# Patient Record
Sex: MALE | Race: WHITE | NOT HISPANIC OR LATINO | Employment: FULL TIME | URBAN - METROPOLITAN AREA
[De-identification: names, ages, dates, MRNs, and addresses within clinical notes are randomized per-mention and may not be internally consistent; named-entity substitution may affect disease eponyms.]

---

## 2017-03-28 ENCOUNTER — TRANSFERRED RECORDS (OUTPATIENT)
Dept: HEALTH INFORMATION MANAGEMENT | Facility: CLINIC | Age: 57
End: 2017-03-28

## 2017-04-07 ENCOUNTER — TRANSFERRED RECORDS (OUTPATIENT)
Dept: HEALTH INFORMATION MANAGEMENT | Facility: CLINIC | Age: 57
End: 2017-04-07

## 2018-03-30 ENCOUNTER — TRANSFERRED RECORDS (OUTPATIENT)
Dept: HEALTH INFORMATION MANAGEMENT | Facility: CLINIC | Age: 58
End: 2018-03-30

## 2018-04-06 ENCOUNTER — TRANSFERRED RECORDS (OUTPATIENT)
Dept: HEALTH INFORMATION MANAGEMENT | Facility: CLINIC | Age: 58
End: 2018-04-06

## 2019-04-02 ENCOUNTER — TRANSFERRED RECORDS (OUTPATIENT)
Dept: HEALTH INFORMATION MANAGEMENT | Facility: CLINIC | Age: 59
End: 2019-04-02

## 2019-05-10 ENCOUNTER — TRANSFERRED RECORDS (OUTPATIENT)
Dept: HEALTH INFORMATION MANAGEMENT | Facility: CLINIC | Age: 59
End: 2019-05-10

## 2019-07-12 ENCOUNTER — TRANSFERRED RECORDS (OUTPATIENT)
Dept: HEALTH INFORMATION MANAGEMENT | Facility: CLINIC | Age: 59
End: 2019-07-12

## 2019-09-20 ENCOUNTER — TRANSFERRED RECORDS (OUTPATIENT)
Dept: HEALTH INFORMATION MANAGEMENT | Facility: CLINIC | Age: 59
End: 2019-09-20

## 2019-10-22 ENCOUNTER — TRANSFERRED RECORDS (OUTPATIENT)
Dept: HEALTH INFORMATION MANAGEMENT | Facility: CLINIC | Age: 59
End: 2019-10-22

## 2019-12-10 ENCOUNTER — TRANSFERRED RECORDS (OUTPATIENT)
Dept: HEALTH INFORMATION MANAGEMENT | Facility: CLINIC | Age: 59
End: 2019-12-10

## 2020-03-10 ENCOUNTER — TRANSFERRED RECORDS (OUTPATIENT)
Dept: HEALTH INFORMATION MANAGEMENT | Facility: CLINIC | Age: 60
End: 2020-03-10

## 2021-03-04 NOTE — PROGRESS NOTES
[facility]  [place]    SUBJECTIVE    CC: Maximino Linares is an 60 year old male who presents for preventive health visit.     Patient has been advised of split billing requirements and indicates understanding: Yes     Healthy Habits:    Do you get at least three servings of calcium containing foods daily (dairy, green leafy vegetables, etc.)? yes    Amount of exercise or daily activities, outside of work: 6-7 day(s) per week    Problems taking medications regularly No    Medication side effects: No    Have you had an eye exam in the past two years? yes    Do you see a dentist twice per year? yes    Do you have sleep apnea, excessive snoring or daytime drowsiness?uses cpap    Today's PHQ-2 Score:   PHQ-2 ( 1999 Pfizer) 3/5/2021   Q1: Little interest or pleasure in doing things 0   Q2: Feeling down, depressed or hopeless 0   PHQ-2 Score 0       Abuse: Current or Past(Physical, Sexual or Emotional)- No  Do you feel safe in your environment? Yes    Have you ever done Advance Care Planning? (For example, a Health Directive, POLST, or a discussion with a medical provider or your loved ones about your wishes): No, advance care planning information given to patient to review.  Advanced care planning was discussed at today's visit.    Social History     Tobacco Use     Smoking status: Never Smoker     Smokeless tobacco: Former User     Types: Chew   Substance Use Topics     Alcohol use: Yes     Alcohol/week: 1.0 - 2.0 standard drinks     Types: 1 - 2 Standard drinks or equivalent per week     Comment: 1-2 per week     If you drink alcohol do you typically have >3 drinks per day or >7 drinks per week? No                      Last PSA: No results found for: PSA    Reviewed orders with patient. Reviewed health maintenance and updated orders accordingly - Yes    See above - extensive history reviewed    Health Maintenance     Colonoscopy:  Due every 5 yrs   FIT:  given              PSA:  pending   DEXA:  [facility]  Maintenance Due   Topic Date Due     ZOSTER IMMUNIZATION (2 of 2) 05/28/2019     COLORECTAL CANCER SCREENING  06/01/2019       Current Problem List    Patient Active Problem List   Diagnosis     Hyperlipidemia LDL goal <130     History of skin cancer     Cardiac arrhythmia, unspecified cardiac arrhythmia type       Past Medical History    Past Medical History:   Diagnosis Date     Cardiac arrhythmia, unspecified cardiac arrhythmia type     benign, extensive work up     History of skin cancer     SCC, BCC     Hyperlipidemia LDL goal <130      Mild intermittent asthma without complication     resolved       Past Surgical History    Past Surgical History:   Procedure Laterality Date     SURGICAL HISTORY OF -  Right     x 2, 2000, 2005, arthroscopy, ACL reconstruction     SURGICAL HISTORY OF -   2017    Deviated septum/soft palpate repair       Current Medications    Current Outpatient Medications   Medication Sig Dispense Refill     atorvastatin (LIPITOR) 40 MG tablet Take 1 tablet (40 mg) by mouth daily 90 tablet 3     sildenafil (VIAGRA) 50 MG tablet Take 1 tablet (50 mg) by mouth daily as needed (30-60 minutes before intercourse) 6 tablet 3       Allergies    Allergies   Allergen Reactions     Morphine Nausea and Vomiting       Immunizations    Immunization History   Administered Date(s) Administered     Pneumococcal 23 valent 12/10/2019     TDAP Vaccine (Boostrix) 02/08/2021     Zoster vaccine recombinant adjuvanted (SHINGRIX) 04/02/2019       Family History    Family History   Problem Relation Age of Onset     Colon Cancer Mother 68     Abdominal Aortic Aneurysm Father      Bladder Cancer Father 75     Arrhythmia Sister      Heart Failure Maternal Grandmother      Lung Cancer Maternal Grandfather         smoker     Heart Failure Paternal Grandmother      Abdominal Aortic Aneurysm Paternal Uncle      Alcoholism Paternal Grandfather      Obesity Paternal Grandfather        Social History    Social History      Socioeconomic History     Marital status:      Spouse name: Rand     Number of children: 3     Years of education: Not on file     Highest education level: Not on file   Occupational History     Not on file   Social Needs     Financial resource strain: Not on file     Food insecurity     Worry: Not on file     Inability: Not on file     Transportation needs     Medical: Not on file     Non-medical: Not on file   Tobacco Use     Smoking status: Never Smoker     Smokeless tobacco: Former User     Types: Chew   Substance and Sexual Activity     Alcohol use: Yes     Alcohol/week: 1.0 - 2.0 standard drinks     Types: 1 - 2 Standard drinks or equivalent per week     Comment: 1-2 per week     Drug use: Not Currently     Sexual activity: Yes   Lifestyle     Physical activity     Days per week: Not on file     Minutes per session: Not on file     Stress: Not on file   Relationships     Social connections     Talks on phone: Not on file     Gets together: Not on file     Attends Druze service: Not on file     Active member of club or organization: Not on file     Attends meetings of clubs or organizations: Not on file     Relationship status: Not on file     Intimate partner violence     Fear of current or ex partner: Not on file     Emotionally abused: Not on file     Physically abused: Not on file     Forced sexual activity: Not on file   Other Topics Concern     Not on file   Social History Narrative     Not on file       ROS    CONSTITUTIONAL: NEGATIVE for fever, chills, change in weight  INTEGUMENTARY/SKIN: NEGATIVE for worrisome rashes, moles or lesions  EYES: NEGATIVE for vision changes or irritation  ENT/MOUTH: NEGATIVE for ear, mouth and throat problems  RESP: NEGATIVE for significant cough or SOB  BREAST: NEGATIVE for masses, tenderness or discharge  CV: NEGATIVE for chest pain, palpitations or peripheral edema  GI: NEGATIVE for nausea, abdominal pain, heartburn, or change in bowel habits  :  "NEGATIVE for frequency, dysuria, or hematuria  MUSCULOSKELETAL: NEGATIVE for significant arthralgias or myalgia  NEURO: NEGATIVE for weakness, dizziness or paresthesias  ENDOCRINE: NEGATIVE for temperature intolerance, skin/hair changes  HEME: NEGATIVE for bleeding problems  PSYCHIATRIC: NEGATIVE for changes in mood or affect    OBJECTIVE    /72   Pulse 84   Temp 96.8  F (36  C) (Tympanic)   Ht 1.803 m (5' 11\")   Wt 89.4 kg (197 lb)   SpO2 96%   BMI 27.48 kg/m      EXAM:    GENERAL: healthy, alert and no distress  EYES: Eyes grossly normal to inspection, PERRL and conjunctivae and sclerae normal  HENT: ear canals and TM's normal, nose and mouth without ulcers or lesions  NECK: no adenopathy, no asymmetry, masses, or scars and thyroid normal to palpation  RESP: lungs clear to auscultation - no rales, rhonchi or wheezes  CV: regular rate and rhythm, normal S1 S2, no S3 or S4, no murmur, click or rub, no peripheral edema and peripheral pulses strong  ABDOMEN: soft, nontender, no hepatosplenomegaly, no masses and bowel sounds normal   (male): testicles normal without atrophy or masses, no hernias and penis normal without urethral discharge  RECTAL: normal sphincter tone, no rectal masses and prostate of normal size for age, smooth, nontender without masses/nodules  MS: no gross musculoskeletal defects noted, no edema  SKIN: no suspicious lesions or rashes  NEURO: Normal strength and tone, mentation intact and speech normal  PSYCH: mentation appears normal, affect normal/bright  LYMPH: no cervical, supraclavicular, axillary, or inguinal adenopathy    DIAGNOSTICS/PROCEDURES    Pending    ASSESSMENT      ICD-10-CM    1. Routine general medical examination at a health care facility  Z00.00 REVIEW OF HEALTH MAINTENANCE PROTOCOL ORDERS     HIV Antigen Antibody Combo     Hepatitis C Screen Reflex to HCV RNA Quant and Genotype     Comprehensive metabolic panel     Lipid panel reflex to direct LDL Fasting     CK " total     CBC with platelets     TSH with free T4 reflex     UA reflex to Microscopic and Culture     Albumin Random Urine Quantitative with Creat Ratio     Prostate spec antigen screen     Fecal colorectal cancer screen FIT     atorvastatin (LIPITOR) 40 MG tablet     sildenafil (VIAGRA) 50 MG tablet     Urine Microscopic   2. Hyperlipidemia LDL goal <130  E78.5 REVIEW OF HEALTH MAINTENANCE PROTOCOL ORDERS     Comprehensive metabolic panel     Lipid panel reflex to direct LDL Fasting     CK total   3. History of skin cancer  Z85.828 REVIEW OF HEALTH MAINTENANCE PROTOCOL ORDERS     DERMATOLOGY ADULT REFERRAL   4. Cardiac arrhythmia, unspecified cardiac arrhythmia type  I49.9 REVIEW OF HEALTH MAINTENANCE PROTOCOL ORDERS     Comprehensive metabolic panel     TSH with free T4 reflex   5. Family history of colon cancer  Z80.0    6. Screen for colon cancer  Z12.11 REVIEW OF HEALTH MAINTENANCE PROTOCOL ORDERS     Fecal colorectal cancer screen FIT   7. Screening for prostate cancer  Z12.5 REVIEW OF HEALTH MAINTENANCE PROTOCOL ORDERS     Prostate spec antigen screen   8. Screening for HIV (human immunodeficiency virus)  Z11.4 REVIEW OF HEALTH MAINTENANCE PROTOCOL ORDERS     HIV Antigen Antibody Combo   9. Need for hepatitis C screening test  Z11.59 REVIEW OF HEALTH MAINTENANCE PROTOCOL ORDERS     Hepatitis C Screen Reflex to HCV RNA Quant and Genotype   10. Medication monitoring encounter  Z51.81 REVIEW OF HEALTH MAINTENANCE PROTOCOL ORDERS     Comprehensive metabolic panel     Lipid panel reflex to direct LDL Fasting     CK total     CBC with platelets     TSH with free T4 reflex     UA reflex to Microscopic and Culture     Albumin Random Urine Quantitative with Creat Ratio       PLAN    Discussed treatment/modality options, including risk and benefits, he desires:    advised alcohol consumption 1oz per day or less, advised aspirin 81 mg po daily, advised 1 multivitamin per day, advised calcium 0187-1017 mg/d and Vitamin D  "800-1200 IU/d, advised dentist every 6 months, advised diet and exercise, advised opthalmologist every 1-2 years, advised self testicular exam q month, further health care maintenance, further lab(s), medication refill(s) and observation    All diagnosis above reviewed and noted above, otherwise stable.      See [facility] orders for further details.      1) med refills    2) labs    3) dermatology - Dr Hein    4) check on immunizations    5) check on colonscopy    Return in about 1 year (around 3/5/2022), or if symptoms worsen or fail to improve, for Complete Physical, Medication Recheck Visit, Follow Up Chronic.    Health Maintenance Due   Topic Date Due     ZOSTER IMMUNIZATION (2 of 2) 05/28/2019     COLORECTAL CANCER SCREENING  06/01/2019       COUNSELING    Reviewed preventive health counseling, as reflected in patient instructions    BP Readings from Last 1 Encounters:   03/05/21 126/72     Estimated body mass index is 27.48 kg/m  as calculated from the following:    Height as of this encounter: 1.803 m (5' 11\").    Weight as of this encounter: 89.4 kg (197 lb).           reports that he has never smoked. He has quit using smokeless tobacco.  His smokeless tobacco use included chew.      Counseling Resources:    ATP IV Guidelines  Pooled Cohorts Equation Calculator  FRAX Risk Assessment  ICSI Preventive Guidelines  Dietary Guidelines for Americans, 2010  USDA's MyPlate  ASA Prophylaxis  Lung CA Screening           Lucas Capellan MD, FAAFP     [facility]  [address] [place] [address]  [email or web address]   Office: [phone]  Fax: [phone]  Pager: [phone]       "

## 2021-03-05 ENCOUNTER — OFFICE VISIT (OUTPATIENT)
Dept: FAMILY MEDICINE | Facility: CLINIC | Age: 61
End: 2021-03-05
Payer: COMMERCIAL

## 2021-03-05 VITALS
HEART RATE: 84 BPM | WEIGHT: 197 LBS | BODY MASS INDEX: 27.58 KG/M2 | SYSTOLIC BLOOD PRESSURE: 126 MMHG | HEIGHT: 71 IN | TEMPERATURE: 96.8 F | OXYGEN SATURATION: 96 % | DIASTOLIC BLOOD PRESSURE: 72 MMHG

## 2021-03-05 DIAGNOSIS — I49.9 CARDIAC ARRHYTHMIA, UNSPECIFIED CARDIAC ARRHYTHMIA TYPE: ICD-10-CM

## 2021-03-05 DIAGNOSIS — Z12.5 SCREENING FOR PROSTATE CANCER: ICD-10-CM

## 2021-03-05 DIAGNOSIS — Z11.4 SCREENING FOR HIV (HUMAN IMMUNODEFICIENCY VIRUS): ICD-10-CM

## 2021-03-05 DIAGNOSIS — Z51.81 MEDICATION MONITORING ENCOUNTER: ICD-10-CM

## 2021-03-05 DIAGNOSIS — Z11.59 NEED FOR HEPATITIS C SCREENING TEST: ICD-10-CM

## 2021-03-05 DIAGNOSIS — E78.5 HYPERLIPIDEMIA LDL GOAL <130: ICD-10-CM

## 2021-03-05 DIAGNOSIS — Z85.828 HISTORY OF SKIN CANCER: ICD-10-CM

## 2021-03-05 DIAGNOSIS — Z00.00 ROUTINE GENERAL MEDICAL EXAMINATION AT A HEALTH CARE FACILITY: Primary | ICD-10-CM

## 2021-03-05 DIAGNOSIS — Z80.0 FAMILY HISTORY OF COLON CANCER: ICD-10-CM

## 2021-03-05 DIAGNOSIS — Z12.11 SCREEN FOR COLON CANCER: ICD-10-CM

## 2021-03-05 LAB
ALBUMIN SERPL-MCNC: 3.8 G/DL (ref 3.4–5)
ALBUMIN UR-MCNC: NEGATIVE MG/DL
ALP SERPL-CCNC: 81 U/L (ref 40–150)
ALT SERPL W P-5'-P-CCNC: 45 U/L (ref 0–70)
ANION GAP SERPL CALCULATED.3IONS-SCNC: 3 MMOL/L (ref 3–14)
APPEARANCE UR: CLEAR
AST SERPL W P-5'-P-CCNC: 17 U/L (ref 0–45)
BILIRUB SERPL-MCNC: 0.4 MG/DL (ref 0.2–1.3)
BILIRUB UR QL STRIP: NEGATIVE
BUN SERPL-MCNC: 18 MG/DL (ref 7–30)
CALCIUM SERPL-MCNC: 9.6 MG/DL (ref 8.5–10.1)
CHLORIDE SERPL-SCNC: 110 MMOL/L (ref 94–109)
CHOLEST SERPL-MCNC: 179 MG/DL
CK SERPL-CCNC: 109 U/L (ref 30–300)
CO2 SERPL-SCNC: 27 MMOL/L (ref 20–32)
COLOR UR AUTO: YELLOW
CREAT SERPL-MCNC: 0.99 MG/DL (ref 0.66–1.25)
CREAT UR-MCNC: 144 MG/DL
ERYTHROCYTE [DISTWIDTH] IN BLOOD BY AUTOMATED COUNT: 12.4 % (ref 10–15)
GFR SERPL CREATININE-BSD FRML MDRD: 83 ML/MIN/{1.73_M2}
GLUCOSE SERPL-MCNC: 91 MG/DL (ref 70–99)
GLUCOSE UR STRIP-MCNC: NEGATIVE MG/DL
HCT VFR BLD AUTO: 47.3 % (ref 40–53)
HCV AB SERPL QL IA: NONREACTIVE
HDLC SERPL-MCNC: 44 MG/DL
HGB BLD-MCNC: 15.9 G/DL (ref 13.3–17.7)
HGB UR QL STRIP: ABNORMAL
HIV 1+2 AB+HIV1 P24 AG SERPL QL IA: NONREACTIVE
KETONES UR STRIP-MCNC: NEGATIVE MG/DL
LDLC SERPL CALC-MCNC: 93 MG/DL
LEUKOCYTE ESTERASE UR QL STRIP: NEGATIVE
MCH RBC QN AUTO: 29.1 PG (ref 26.5–33)
MCHC RBC AUTO-ENTMCNC: 33.6 G/DL (ref 31.5–36.5)
MCV RBC AUTO: 87 FL (ref 78–100)
MICROALBUMIN UR-MCNC: 7 MG/L
MICROALBUMIN/CREAT UR: 5.01 MG/G CR (ref 0–17)
NITRATE UR QL: NEGATIVE
NONHDLC SERPL-MCNC: 135 MG/DL
PH UR STRIP: 6.5 PH (ref 5–7)
PLATELET # BLD AUTO: 244 10E9/L (ref 150–450)
POTASSIUM SERPL-SCNC: 4.3 MMOL/L (ref 3.4–5.3)
PROT SERPL-MCNC: 7.6 G/DL (ref 6.8–8.8)
PSA SERPL-ACNC: 0.84 UG/L (ref 0–4)
RBC # BLD AUTO: 5.47 10E12/L (ref 4.4–5.9)
RBC #/AREA URNS AUTO: NORMAL /HPF
SODIUM SERPL-SCNC: 140 MMOL/L (ref 133–144)
SOURCE: ABNORMAL
SP GR UR STRIP: >1.03 (ref 1–1.03)
TRIGL SERPL-MCNC: 211 MG/DL
TSH SERPL DL<=0.005 MIU/L-ACNC: 2.57 MU/L (ref 0.4–4)
UROBILINOGEN UR STRIP-ACNC: 0.2 EU/DL (ref 0.2–1)
WBC # BLD AUTO: 7.9 10E9/L (ref 4–11)
WBC #/AREA URNS AUTO: NORMAL /HPF

## 2021-03-05 PROCEDURE — 80061 LIPID PANEL: CPT | Performed by: FAMILY MEDICINE

## 2021-03-05 PROCEDURE — 81001 URINALYSIS AUTO W/SCOPE: CPT | Performed by: FAMILY MEDICINE

## 2021-03-05 PROCEDURE — 84443 ASSAY THYROID STIM HORMONE: CPT | Performed by: FAMILY MEDICINE

## 2021-03-05 PROCEDURE — 87389 HIV-1 AG W/HIV-1&-2 AB AG IA: CPT | Performed by: FAMILY MEDICINE

## 2021-03-05 PROCEDURE — 80053 COMPREHEN METABOLIC PANEL: CPT | Performed by: FAMILY MEDICINE

## 2021-03-05 PROCEDURE — 99386 PREV VISIT NEW AGE 40-64: CPT | Performed by: FAMILY MEDICINE

## 2021-03-05 PROCEDURE — 82043 UR ALBUMIN QUANTITATIVE: CPT | Performed by: FAMILY MEDICINE

## 2021-03-05 PROCEDURE — G0103 PSA SCREENING: HCPCS | Performed by: FAMILY MEDICINE

## 2021-03-05 PROCEDURE — 86803 HEPATITIS C AB TEST: CPT | Performed by: FAMILY MEDICINE

## 2021-03-05 PROCEDURE — 36415 COLL VENOUS BLD VENIPUNCTURE: CPT | Performed by: FAMILY MEDICINE

## 2021-03-05 PROCEDURE — 82550 ASSAY OF CK (CPK): CPT | Performed by: FAMILY MEDICINE

## 2021-03-05 PROCEDURE — 85027 COMPLETE CBC AUTOMATED: CPT | Performed by: FAMILY MEDICINE

## 2021-03-05 RX ORDER — ATORVASTATIN CALCIUM 40 MG/1
40 TABLET, FILM COATED ORAL DAILY
COMMUNITY
End: 2021-03-05

## 2021-03-05 RX ORDER — ATORVASTATIN CALCIUM 40 MG/1
40 TABLET, FILM COATED ORAL DAILY
Qty: 90 TABLET | Refills: 3 | Status: SHIPPED | OUTPATIENT
Start: 2021-03-05 | End: 2022-03-29

## 2021-03-05 RX ORDER — SILDENAFIL 50 MG/1
50 TABLET, FILM COATED ORAL DAILY PRN
Qty: 6 TABLET | Refills: 3 | Status: SHIPPED | OUTPATIENT
Start: 2021-03-05 | End: 2022-04-11

## 2021-03-05 RX ORDER — SILDENAFIL 50 MG/1
50 TABLET, FILM COATED ORAL DAILY PRN
COMMUNITY
End: 2021-03-05

## 2021-03-05 SDOH — ECONOMIC STABILITY: FOOD INSECURITY: WITHIN THE PAST 12 MONTHS, YOU WORRIED THAT YOUR FOOD WOULD RUN OUT BEFORE YOU GOT MONEY TO BUY MORE.: NOT ASKED

## 2021-03-05 SDOH — ECONOMIC STABILITY: TRANSPORTATION INSECURITY
IN THE PAST 12 MONTHS, HAS THE LACK OF TRANSPORTATION KEPT YOU FROM MEDICAL APPOINTMENTS OR FROM GETTING MEDICATIONS?: NOT ASKED

## 2021-03-05 SDOH — ECONOMIC STABILITY: FOOD INSECURITY: WITHIN THE PAST 12 MONTHS, THE FOOD YOU BOUGHT JUST DIDN'T LAST AND YOU DIDN'T HAVE MONEY TO GET MORE.: NOT ASKED

## 2021-03-05 SDOH — ECONOMIC STABILITY: INCOME INSECURITY: HOW HARD IS IT FOR YOU TO PAY FOR THE VERY BASICS LIKE FOOD, HOUSING, MEDICAL CARE, AND HEATING?: NOT ASKED

## 2021-03-05 SDOH — HEALTH STABILITY: MENTAL HEALTH: HOW OFTEN DO YOU HAVE 6 OR MORE DRINKS ON ONE OCCASION?: NOT ASKED

## 2021-03-05 SDOH — HEALTH STABILITY: MENTAL HEALTH: HOW MANY STANDARD DRINKS CONTAINING ALCOHOL DO YOU HAVE ON A TYPICAL DAY?: NOT ASKED

## 2021-03-05 SDOH — HEALTH STABILITY: MENTAL HEALTH: HOW OFTEN DO YOU HAVE A DRINK CONTAINING ALCOHOL?: NOT ASKED

## 2021-03-05 SDOH — ECONOMIC STABILITY: TRANSPORTATION INSECURITY
IN THE PAST 12 MONTHS, HAS LACK OF TRANSPORTATION KEPT YOU FROM MEETINGS, WORK, OR FROM GETTING THINGS NEEDED FOR DAILY LIVING?: NOT ASKED

## 2021-03-05 ASSESSMENT — MIFFLIN-ST. JEOR: SCORE: 1730.72

## 2021-03-05 NOTE — LETTER
[facility]  [address] [place] [address]  [phone]                    March 8, 2021    Maximino Linares  [address] [address] [address]      Dear Maximino,    Here is a summary of your recent test results:    Labs are overall quite good, except     Mildly elevated triglycerides     We advise:     Continue current cares.   Balanced low cholesterol diet.   Regular exercise.     Recheck fasting labs in 3-4 months (lipid reflex)     Your test results are enclosed.             Thank you very much for trusting [facility].     Healthy regards,          Lucas Capellan M.D.        Results for orders placed or performed in visit on 03/05/21   HIV Antigen Antibody Combo     Status: None   Result Value Ref Range    HIV Antigen Antibody Combo Nonreactive NR^Nonreactive       Hepatitis C Screen Reflex to HCV RNA Quant and Genotype     Status: None   Result Value Ref Range    Hepatitis C Antibody Nonreactive NR^Nonreactive   Comprehensive metabolic panel     Status: Abnormal   Result Value Ref Range    Sodium 140 133 - 144 mmol/L    Potassium 4.3 3.4 - 5.3 mmol/L    Chloride 110 (H) 94 - 109 mmol/L    Carbon Dioxide 27 20 - 32 mmol/L    Anion Gap 3 3 - 14 mmol/L    Glucose 91 70 - 99 mg/dL    Urea Nitrogen 18 7 - 30 mg/dL    Creatinine 0.99 0.66 - 1.25 mg/dL    GFR Estimate 83 >60 mL/min/[1.73_m2]    GFR Estimate If Black >90 >60 mL/min/[1.73_m2]    Calcium 9.6 8.5 - 10.1 mg/dL    Bilirubin Total 0.4 0.2 - 1.3 mg/dL    Albumin 3.8 3.4 - 5.0 g/dL    Protein Total 7.6 6.8 - 8.8 g/dL    Alkaline Phosphatase 81 40 - 150 U/L    ALT 45 0 - 70 U/L    AST 17 0 - 45 U/L   Lipid panel reflex to direct LDL Fasting     Status: Abnormal   Result Value Ref Range    Cholesterol 179 <200 mg/dL    Triglycerides 211 (H) <150 mg/dL    HDL Cholesterol 44 >39 mg/dL    LDL Cholesterol Calculated 93 <100 mg/dL    Non HDL Cholesterol 135 (H) <130 mg/dL   CK total     Status: None    Result Value Ref Range    CK Total 109 30 - 300 U/L   CBC with platelets     Status: None   Result Value Ref Range    WBC 7.9 4.0 - 11.0 10e9/L    RBC Count 5.47 4.4 - 5.9 10e12/L    Hemoglobin 15.9 13.3 - 17.7 g/dL    Hematocrit 47.3 40.0 - 53.0 %    MCV 87 78 - 100 fl    MCH 29.1 26.5 - 33.0 pg    MCHC 33.6 31.5 - 36.5 g/dL    RDW 12.4 10.0 - 15.0 %    Platelet Count 244 150 - 450 10e9/L   TSH with free T4 reflex     Status: None   Result Value Ref Range    TSH 2.57 0.40 - 4.00 mU/L   UA reflex to Microscopic and Culture     Status: Abnormal    Specimen: Midstream Urine   Result Value Ref Range    Color Urine Yellow     Appearance Urine Clear     Glucose Urine Negative NEG^Negative mg/dL    Bilirubin Urine Negative NEG^Negative    Ketones Urine Negative NEG^Negative mg/dL    Specific Gravity Urine >1.030 1.003 - 1.035    Blood Urine Trace (A) NEG^Negative    pH Urine 6.5 5.0 - 7.0 pH    Protein Albumin Urine Negative NEG^Negative mg/dL    Urobilinogen Urine 0.2 0.2 - 1.0 EU/dL    Nitrite Urine Negative NEG^Negative    Leukocyte Esterase Urine Negative NEG^Negative    Source Midstream Urine    Albumin Random Urine Quantitative with Creat Ratio     Status: None   Result Value Ref Range    Creatinine Urine 144 mg/dL    Albumin Urine mg/L 7 mg/L    Albumin Urine mg/g Cr 5.01 0 - 17 mg/g Cr   Prostate spec antigen screen     Status: None   Result Value Ref Range    PSA 0.84 0 - 4 ug/L   Urine Microscopic     Status: None   Result Value Ref Range    WBC Urine 0 - 5 OTO5^0 - 5 /HPF    RBC Urine O - 2 OTO2^O - 2 /HPF

## 2021-03-06 ASSESSMENT — ASTHMA QUESTIONNAIRES: ACT_TOTALSCORE: 25

## 2021-05-14 ENCOUNTER — OFFICE VISIT (OUTPATIENT)
Dept: DERMATOLOGY | Facility: CLINIC | Age: 61
End: 2021-05-14
Attending: FAMILY MEDICINE
Payer: COMMERCIAL

## 2021-05-14 VITALS — SYSTOLIC BLOOD PRESSURE: 122 MMHG | DIASTOLIC BLOOD PRESSURE: 80 MMHG | HEART RATE: 73 BPM | OXYGEN SATURATION: 97 %

## 2021-05-14 DIAGNOSIS — Z85.828 HISTORY OF SKIN CANCER: ICD-10-CM

## 2021-05-14 DIAGNOSIS — D22.9 NEVUS: Primary | ICD-10-CM

## 2021-05-14 DIAGNOSIS — D18.01 ANGIOMA OF SKIN: ICD-10-CM

## 2021-05-14 DIAGNOSIS — L81.4 LENTIGO: ICD-10-CM

## 2021-05-14 DIAGNOSIS — L82.1 SEBORRHEIC KERATOSIS: ICD-10-CM

## 2021-05-14 PROCEDURE — 99203 OFFICE O/P NEW LOW 30 MIN: CPT | Performed by: PHYSICIAN ASSISTANT

## 2021-05-14 NOTE — LETTER
5/14/2021         RE: Maximino Linares  [address] [address] [address]        Dear Colleague,    Thank you for referring your patient, Maximino Linares, to the [facility]. Please see a copy of my visit note below.    HPI:   Chief complaints: Maximino Linares is a pleasant 61 year old male who presents for Full skin cancer screening to rule out skin cancer   Last Skin Exam: about 1 year ago in [place]      1st Baseline: no  Personal HX of Skin Cancer: yes BCC and SCC   Personal HX of Malignant Melanoma: no   Family HX of Skin Cancer / Malignant Melanoma: no  Personal HX of Atypical Moles:   no  Risk factors: history of sun exposure and burns  New / Changing lesions:no  Social History: from [place] originally. Was in [place] for the past 5 years. Enjoys fishing  On review of systems, there are no further skin complaints, patient is feeling otherwise well.   ROS of the following were done and are negative: Constitutional, Eyes, Ears, Nose,   Mouth, Throat, Cardiovascular, Respiratory, GI, Genitourinary, Musculoskeletal,   Psychiatric, Endocrine, Allergic/Immunologic.    PHYSICAL EXAM:   /80   Pulse 73   SpO2 97%   Skin exam performed as follows: Type 2 skin. Mood appropriate  Alert and Oriented X 3. Well developed, well nourished in no distress.  General appearance: Normal  Head including face: Normal  Eyes: conjunctiva and lids: Normal  Mouth: Lips, teeth, gums: Normal  Neck: Normal  Chest-breast/axillae: Normal  Back: Normal  Spleen and liver: Normal  Cardiovascular: Exam of peripheral vascular system by observation for swelling, varicosities, edema: Normal  Genitalia: groin, buttocks: Normal  Extremities: digits/nails (clubbing): Normal  Eccrine and Apocrine glands: Normal  Right upper extremity: Normal  Left upper extremity: Normal  Right lower extremity: Normal  Left lower extremity: Normal  Skin: Scalp and body hair: See below    Pt deferred exam of breasts, groin, buttocks:  No    Other physical findings:  1. Multiple pigmented macules on extremities and trunk  2. Multiple pigmented macules on face, trunk and extremities  3. Multiple vascular papules on trunk, arms and legs  4. Multiple scattered keratotic plaques  5. Firm papule with dimple sign on the left medial buttocks       Except as noted above, no other signs of skin cancer or melanoma.     ASSESSMENT/PLAN:   Benign Full skin cancer screening today. . Patient with history of BCC and SCC  Advised on monthly self exams and 1 year  Patient Education: Appropriate brochures given.    1. Multiple benign appearing nevi on arms, legs and trunk. Discussed ABCDEs of melanoma and sunscreen.   2. Multiple lentigos on arms, legs and trunk. Advised benign, no treatment needed.  3. Multiple scattered angiomas. Advised benign, no treatment needed.   4. Seborrheic keratosis on arms, legs and trunk. Advised benign, no treatment needed.  5. Dermatofibroma on the left medial buttocks. Advised benign no treatment needed.               Follow-up: yearly FSE/PRN sooner    1.) Patient was asked about new and changing moles. YES  2.) Patient received a complete physical skin examination: YES  3.) Patient was counseled to perform a monthly self skin examination: YES  Scribed By: Niru Dailey, MS, PAERICA          Again, thank you for allowing me to participate in the care of your patient.        Sincerely,        Niru Dailey PA-C

## 2021-05-14 NOTE — PROGRESS NOTES
HPI:   Chief complaints: Maximino Linares is a pleasant 61 year old male who presents for Full skin cancer screening to rule out skin cancer   Last Skin Exam: about 1 year ago in [place]      1st Baseline: no  Personal HX of Skin Cancer: yes BCC and SCC   Personal HX of Malignant Melanoma: no   Family HX of Skin Cancer / Malignant Melanoma: no  Personal HX of Atypical Moles:   no  Risk factors: history of sun exposure and burns  New / Changing lesions:no  Social History: from [place] originally. Was in [place] for the past 5 years. Enjoys fishing  On review of systems, there are no further skin complaints, patient is feeling otherwise well.   ROS of the following were done and are negative: Constitutional, Eyes, Ears, Nose,   Mouth, Throat, Cardiovascular, Respiratory, GI, Genitourinary, Musculoskeletal,   Psychiatric, Endocrine, Allergic/Immunologic.    PHYSICAL EXAM:   /80   Pulse 73   SpO2 97%   Skin exam performed as follows: Type 2 skin. Mood appropriate  Alert and Oriented X 3. Well developed, well nourished in no distress.  General appearance: Normal  Head including face: Normal  Eyes: conjunctiva and lids: Normal  Mouth: Lips, teeth, gums: Normal  Neck: Normal  Chest-breast/axillae: Normal  Back: Normal  Spleen and liver: Normal  Cardiovascular: Exam of peripheral vascular system by observation for swelling, varicosities, edema: Normal  Genitalia: groin, buttocks: Normal  Extremities: digits/nails (clubbing): Normal  Eccrine and Apocrine glands: Normal  Right upper extremity: Normal  Left upper extremity: Normal  Right lower extremity: Normal  Left lower extremity: Normal  Skin: Scalp and body hair: See below    Pt deferred exam of breasts, groin, buttocks: No    Other physical findings:  1. Multiple pigmented macules on extremities and trunk  2. Multiple pigmented macules on face, trunk and extremities  3. Multiple vascular papules on trunk, arms and legs  4. Multiple scattered keratotic plaques  5. Firm papule  with dimple sign on the left medial buttocks       Except as noted above, no other signs of skin cancer or melanoma.     ASSESSMENT/PLAN:   Benign Full skin cancer screening today. . Patient with history of BCC and SCC  Advised on monthly self exams and 1 year  Patient Education: Appropriate brochures given.    1. Multiple benign appearing nevi on arms, legs and trunk. Discussed ABCDEs of melanoma and sunscreen.   2. Multiple lentigos on arms, legs and trunk. Advised benign, no treatment needed.  3. Multiple scattered angiomas. Advised benign, no treatment needed.   4. Seborrheic keratosis on arms, legs and trunk. Advised benign, no treatment needed.  5. Dermatofibroma on the left medial buttocks. Advised benign no treatment needed.               Follow-up: yearly FSE/PRN sooner    1.) Patient was asked about new and changing moles. YES  2.) Patient received a complete physical skin examination: YES  3.) Patient was counseled to perform a monthly self skin examination: YES  Scribed By: Niru Dailey MS, PAERICA

## 2021-10-06 ENCOUNTER — TELEPHONE (OUTPATIENT)
Dept: FAMILY MEDICINE | Facility: CLINIC | Age: 61
End: 2021-10-06

## 2021-10-06 DIAGNOSIS — R06.81 APNEA: Primary | ICD-10-CM

## 2021-10-06 NOTE — TELEPHONE ENCOUNTER
Patient calling in regards to CPAP machine recall. He is in need of a new order to be placed. Please advise.     Stanford Benito

## 2021-10-07 NOTE — TELEPHONE ENCOUNTER
Please review with patient    Advise sleep clinic consult - NATALIE Garcia - to make sure we get the right CPAP for him         Your sleep apnea treatment may be affected by device recall     Our records show that you may have a Jermaine Respironics CPAP for the treatment of sleep apnea. Many of these devices have been recalled* by the  for replacement. [facility] recommends:     1) If you are using a Resmed device, continue using the device.  2) If you have a Jermaine Respironics device, register your device for confirmation of type of device and repair of the device at [email or web address]/sleep/communications/src-update -if you cannot use link, call [phone].  The website will assist you in obtaining the serial number for registration.   3) If you are using a Jermaine Respironics CPAP or Bilevel PAP device and you do not have immediate breathing, driving or cardiovascular risks without the device, consider stopping use of the device after verification that is has been recalled. Discuss this decision with your medical provider if you are uncertain about your medical risks.  4) If you are not using Respironics CPAP but are using a Respironics advanced device for breathing support (AVAPS, ASV, Bilevel PAP), continue using the device and review 5 and 6 below).     5) If you continue the device, do not include ozone generating  connected to PAP devices.  6) f you continue the device, you may choose to use a bacterial filter to reduce particulates from the device although on CPAP devices, pressures may need to be increased with the filter in place. These filters are not as effective as repair of the device.

## 2021-10-07 NOTE — TELEPHONE ENCOUNTER
Called # [phone]     Pt called advised of note below. Pt stated he has contacted Jermaine below and will be months before getting replacement. Pt noted he did contact insurance and was advised can get replacement device if having new order. Writer advised will place new order and fax to [facility] medical in [place]. Pt did not know settings noting should be in chart, recent sleep study 2 years ago.      Alberto Juarez RN   [facility] Triage

## 2021-10-13 ENCOUNTER — TELEPHONE (OUTPATIENT)
Dept: FAMILY MEDICINE | Facility: CLINIC | Age: 61
End: 2021-10-13

## 2022-01-25 ENCOUNTER — ANCILLARY PROCEDURE (OUTPATIENT)
Dept: GENERAL RADIOLOGY | Facility: CLINIC | Age: 62
End: 2022-01-25
Attending: NURSE PRACTITIONER
Payer: COMMERCIAL

## 2022-01-25 ENCOUNTER — OFFICE VISIT (OUTPATIENT)
Dept: FAMILY MEDICINE | Facility: CLINIC | Age: 62
End: 2022-01-25
Payer: COMMERCIAL

## 2022-01-25 VITALS
SYSTOLIC BLOOD PRESSURE: 120 MMHG | WEIGHT: 200 LBS | BODY MASS INDEX: 28 KG/M2 | DIASTOLIC BLOOD PRESSURE: 80 MMHG | OXYGEN SATURATION: 97 % | HEIGHT: 71 IN | RESPIRATION RATE: 18 BRPM | TEMPERATURE: 97.5 F | HEART RATE: 81 BPM

## 2022-01-25 DIAGNOSIS — M25.552 HIP PAIN, LEFT: ICD-10-CM

## 2022-01-25 DIAGNOSIS — M51.369 DDD (DEGENERATIVE DISC DISEASE), LUMBAR: ICD-10-CM

## 2022-01-25 DIAGNOSIS — M54.50 LUMBAR BACK PAIN: ICD-10-CM

## 2022-01-25 DIAGNOSIS — M54.16 LUMBAR RADICULOPATHY: Primary | ICD-10-CM

## 2022-01-25 PROCEDURE — 73502 X-RAY EXAM HIP UNI 2-3 VIEWS: CPT | Mod: FY | Performed by: RADIOLOGY

## 2022-01-25 PROCEDURE — 99213 OFFICE O/P EST LOW 20 MIN: CPT | Performed by: NURSE PRACTITIONER

## 2022-01-25 PROCEDURE — 72100 X-RAY EXAM L-S SPINE 2/3 VWS: CPT | Mod: FY | Performed by: RADIOLOGY

## 2022-01-25 RX ORDER — NAPROXEN 500 MG/1
500 TABLET ORAL 2 TIMES DAILY WITH MEALS
Qty: 45 TABLET | Refills: 1 | Status: SHIPPED | OUTPATIENT
Start: 2022-01-25 | End: 2022-04-11

## 2022-01-25 ASSESSMENT — MIFFLIN-ST. JEOR: SCORE: 1739.32

## 2022-01-25 NOTE — PATIENT INSTRUCTIONS
Naproxen scheduled every 12 hours for 5-7 days, may take Acetaminophen, 1,000 mg every 6 hours (max of 3,000 mg daily).      No Ibuprofen, Aleve or aspirin with use of Naproxen.

## 2022-01-25 NOTE — PROGRESS NOTES
"  Assessment & Plan     Maximino was seen today for fall.    Diagnoses and all orders for this visit:    Lumbar radiculopathy  Hip pain, left  DDD (degenerative disc disease), lumbar    -     XR Lumbar Spine 2/3 Views; Future                                                    IMPRESSION: 5 lumbar type vertebral bodies. Straightening of the  lumbar lordosis. Normal vertebral body heights and sagittal alignment.  Moderate degenerative disc disease at L4-L5 and severe degenerative  disc disease at L5-S1 with disc height loss, sclerotic endplate  changes and osteophyte formation. L4-L5 and L5-S1 facet arthrosis.  Normal appearance of the sacrum and the sacroiliac joints. Normal  bowel gas pattern.  Left hip x-ray showed mild hip joint space narrowing.    Recommended proceeding with physical therapy, scheduled Naproxen 500 mg twice daily for 5-7 days.   With no improvement or worsening plan further consultation with spine specialty.    -     naproxen (NAPROSYN) 500 MG tablet; Take 1 tablet (500 mg) by mouth 2 times daily (with meals)  -     Spine  Referral; Future  -     Physical Therapy Referral; Future      BMI:   Estimated body mass index is 27.89 kg/m  as calculated from the following:    Height as of this encounter: 1.803 m (5' 11\").    Weight as of this encounter: 90.7 kg (200 lb).     Return in about 2 weeks (around 2/8/2022) for Orthopedic consultation.    Symone Cutler, KAREN [facility]   Maximino is a 61 year old who presents for the following health issues     HPI     Pain History:    Shoveled path 3 weeks ago for dogs.  Stepped off deck and both feet went from under him, landed on right side of buttocks.  Was able to complete entire shoveling and now with persistent left lower back/left hip pain.   Aggravated with walking - dull pain, left butt cheek and radiates to left LE.    +Numbness and tingling in left LE.      History of right lumbar back pain with " "sciatica.  History of left sciatica issues as well.    Done several chiropractic visits.  No imaging done.        When did you first notice your pain? - 1 to 6 weeks   Have you seen any provider previously for this issue? No  How has your pain affected your ability to work? Not applicable  Where in your body do you have pain? Musculoskeletal problem/pain  Onset/Duration: 3 weeks   Description  Location: left butt low back left side to the calf  Joint Swelling: no  Redness: no  Pain: YES  Warmth: no  Intensity:  moderate, severe  Progression of Symptoms:  worsening  Accompanying signs and symptoms:   Fevers: no  Numbness/tingling/weakness: some tingling  History  Trauma to the area: YES- fell on concrete patio  Recent illness:  no  Previous similar problem: no  Previous evaluation:  no  Precipitating or alleviating factors:  Aggravating factors include: standing and walking  Therapies tried and outcome:  Chiropractor- ice packs, Ibuprofen, 400 mg 1-2 times daily and Tylenol 500 mg, 1-2 times daily as needed.          Review of Systems   Constitutional, HEENT, cardiovascular, pulmonary, gi and gu systems are negative, except as otherwise noted.      Objective    /80   Pulse 81   Temp 97.5  F (36.4  C) (Tympanic)   Resp 18   Ht 1.803 m (5' 11\")   Wt 90.7 kg (200 lb)   SpO2 97%   BMI 27.89 kg/m    Body mass index is 27.89 kg/m .  Physical Exam   GENERAL: healthy, alert and no distress  MS: spine with full ROM, pain elicited with forward flexion and lateral movements  Left hip with full ROM, pain elicited with adduction of hip  NEURO: Normal strength and tone in LE's, mentation intact and speech normal  PSYCH: mentation appears normal, affect normal/bright              "

## 2022-01-30 ENCOUNTER — HEALTH MAINTENANCE LETTER (OUTPATIENT)
Age: 62
End: 2022-01-30

## 2022-02-07 ENCOUNTER — THERAPY VISIT (OUTPATIENT)
Dept: PHYSICAL THERAPY | Facility: CLINIC | Age: 62
End: 2022-02-07
Attending: NURSE PRACTITIONER
Payer: COMMERCIAL

## 2022-02-07 DIAGNOSIS — M51.369 DDD (DEGENERATIVE DISC DISEASE), LUMBAR: ICD-10-CM

## 2022-02-07 DIAGNOSIS — M25.552 HIP PAIN, LEFT: ICD-10-CM

## 2022-02-07 DIAGNOSIS — M54.16 LUMBAR RADICULOPATHY: ICD-10-CM

## 2022-02-07 PROCEDURE — 97530 THERAPEUTIC ACTIVITIES: CPT | Mod: GP | Performed by: PHYSICAL THERAPIST

## 2022-02-07 PROCEDURE — 97162 PT EVAL MOD COMPLEX 30 MIN: CPT | Mod: GP | Performed by: PHYSICAL THERAPIST

## 2022-02-07 PROCEDURE — 97110 THERAPEUTIC EXERCISES: CPT | Mod: GP | Performed by: PHYSICAL THERAPIST

## 2022-02-07 NOTE — PROGRESS NOTES
[facility] Initial Evaluation -- Lumbar    Date: February 7, 2022  Maximino Linares is a 61 year old male with a lumbar/L hip condition.   Referral: Symone Cutler CNP  Work mechanical stresses:  Desk work, sitting, lots of walking, stairs  Employment status:   at oil refinery   Leisure mechanical stresses: Fish, take care of dogs  Functional disability score (BELLA/STarT Back):  See flowsheet  VAS score (0-10): 4/10, earlier today 6/10  Patient goals/expectations:  Reduce pain, avoid surgery    HISTORY:    Present symptoms: L hip, calf, ankle  Pain quality (sharp/shooting/stabbing/aching/burning/cramping):  Dull, aching, sometimes sharp   Paresthesia (yes/no):  yes    Present since (onset date): January 5, 2022.     Symptoms (improving/unchanging/worsening):  Unchanging.     Symptoms commenced as a result of: falling while shoveling, fell on R hip, had back pain and symptoms down left side  Condition occurred in the following environment:   Outside shoveling snow     Symptoms at onset (back/thigh/leg): back, hip, thigh, leg to ankle  Constant symptoms (back/thigh/leg): hip, calf, ankle  Intermittent symptoms (back/thigh/leg):     Symptoms are made worse with the following: walking (walking is the worst), standing  Symptoms are made better with the following: sitting, laying down - staying off left side, prescription anti-inflammatory, ice    Disturbed sleep (yes/no):  Yes Sleeping postures (prone/sup/side R/L): Side R/L    Previous episodes (0/1-5/6-10/11+): 1-5 Year of first episode: young child    Previous history:   Previous treatments: chiropractor (helpful with some episodes and not with others)      Specific Questions:  Cough/Sneeze/Strain (pos/neg): neg  Bowel/Bladder (normal/abnormal): nomral  Gait (normal/abnormal): abnormal  Medications (nil/NSAIDS/analg/steroids/anticoag/other):  Other - Atrovistatin  Medical allergies:  Morphine  General health  "(excellent/good/fair/poor):  good  Pertinent medical history:  Asthma and Sleep disorder/apnea  Imaging (None/Xray/MRI/Other):  Xray  Recent or major surgery (yes/no):  Right knee surgery  Night pain (yes/no): Yes  Accidents (yes/no): no  Unexplained weight loss (yes/no): no  Barriers at home: no  Other red flags: none to note    EXAMINATION    Posture:   Sitting (good/fair/poor): poor  Standing (good/fair/poor):poor  Lordosis (red/acc/normal): red  Correction of posture (better/worse/no effect): Worse - pain in medial L knee and incr L hip pain    Lateral Shift (right/left/nil): right  Relevant (yes/no):  yes  Other Observations: weight shifted to R LE, antalgic gait    Neurological:    Motor deficit:  Decreased power in R L4/L5/S1 myotomes  Dural signs:  Positive slump left    Movement Loss:   Gaurav Mod Min Nil Pain   Flexion  x   \"Pulling\" in L low back   Extension  x      Side Gliding R  x      Side Gliding L  x        Test Movements:   During: produces, abolishes, increases, decreases, no effect, centralizing, peripheralizing   After: better, worse, no better, no worse, no effect, centralized, peripheralized      Pretest symptoms lying: L hip, calf, ankle pain    Symptoms During Symptoms After ROM increased ROM decreased No Effect   Sustained flexion rotation Increases    Worse      x     Pretest symptoms: L hip, calf, ankle pain   Symptoms During Symptoms After ROM increased ROM decreased No Effect   SGIS - L Increases    No Worse         Rep SGIS - L Centralising    No better    x         Other Tests:     Provisional Classification:  Derangement - Asymmetrical, unilateral, symptoms below knee, with shift deformity    Principle of Management:  Education:  Posture education, traffic light, listening to symptoms   Equipment provided:  NA  Mechanical therapy (Y/N):  Y   Extension principle:    Lateral Principle:  L SG x10 reps every 2 hours  Flexion principle:    Other:      ASSESSMENT/PLAN:    Patient is a 61 year " old male with lumbar and left side hip complaints.    Patient has the following significant findings with corresponding treatment plan.                Diagnosis 1:  Lumbar radiculopathy  Pain -  hot/cold therapy, self management and home program  Decreased ROM/flexibility - manual therapy and therapeutic exercise  Decreased joint mobility - manual therapy and therapeutic exercise  Decreased strength - therapeutic exercise and therapeutic activities  Impaired muscle performance - neuro re-education  Decreased function - therapeutic activities  Impaired posture - neuro re-education  Diagnosis 2: Left hip pain Pain -  hot/cold therapy, self management and home program  Decreased ROM/flexibility - manual therapy and therapeutic exercise  Decreased joint mobility - manual therapy and therapeutic exercise  Decreased strength - therapeutic exercise and therapeutic activities  Impaired muscle performance - neuro re-education  Decreased function - therapeutic activities  Impaired posture - neuro re-education    Therapy Evaluation Codes:   1) History comprised of:   Personal factors that impact the plan of care:      None.    Comorbidity factors that impact the plan of care are:      Asthma and Sleep disorder/apnea.     Medications impacting care: Atrovistatin.  2) Examination of Body Systems comprised of:   Body structures and functions that impact the plan of care:      Lumbar spine.   Activity limitations that impact the plan of care are:      Reading/Computer work, Sitting, Standing and Walking.  3) Clinical presentation characteristics are:   Evolving/Changing.  4) Decision-Making    Moderate complexity using standardized patient assessment instrument and/or measureable assessment of functional outcome.  Cumulative Therapy Evaluation is: Moderate complexity.    Previous and current functional limitations:  (See Goal Flow Sheet for this information)    Short term and Long term goals: (See Goal Flow Sheet for this  information)     Communication ability:  Patient appears to be able to clearly communicate and understand verbal and written communication and follow directions correctly.  Treatment Explanation - The following has been discussed with the patient:   RX ordered/plan of care  Anticipated outcomes  Possible risks and side effects  This patient would benefit from PT intervention to resume normal activities.   Rehab potential is good.    Frequency:  1 X week, once daily  Duration:  for 6 weeks      Please refer to the daily flowsheet for treatment today, total treatment time and time spent performing 1:1 timed codes.

## 2022-02-08 PROBLEM — M54.16 LUMBAR RADICULOPATHY: Status: ACTIVE | Noted: 2022-02-08

## 2022-02-08 ASSESSMENT — ACTIVITIES OF DAILY LIVING (ADL)
WALKING_INITIALLY: MODERATE DIFFICULTY
GOING_DOWN_1_FLIGHT_OF_STAIRS: MODERATE DIFFICULTY
STANDING_FOR_15_MINUTES: EXTREME DIFFICULTY
GETTING_INTO_AND_OUT_OF_AN_AVERAGE_CAR: MODERATE DIFFICULTY
TWISTING/PIVOTING_ON_INVOLVED_LEG: MODERATE DIFFICULTY
STEPPING_UP_AND_DOWN_CURBS: MODERATE DIFFICULTY
RECREATIONAL_ACTIVITIES: EXTREME DIFFICULTY
LIGHT_TO_MODERATE_WORK: SLIGHT DIFFICULTY
WALKING_DOWN_STEEP_HILLS: EXTREME DIFFICULTY
HOS_ADL_ITEM_SCORE_TOTAL: 31
HOS_ADL_HIGHEST_POTENTIAL_SCORE: 68
GOING_UP_1_FLIGHT_OF_STAIRS: MODERATE DIFFICULTY
GETTING_INTO_AND_OUT_OF_A_BATHTUB: SLIGHT DIFFICULTY
HEAVY_WORK: EXTREME DIFFICULTY
DEEP_SQUATTING: MODERATE DIFFICULTY
SITTING_FOR_15_MINUTES: NO DIFFICULTY AT ALL
HOS_ADL_SCORE(%): 45.59
PUTTING_ON_SOCKS_AND_SHOES: MODERATE DIFFICULTY
WALKING_UP_STEEP_HILLS: MODERATE DIFFICULTY
WALKING_APPROXIMATELY_10_MINUTES: MODERATE DIFFICULTY
HOS_ADL_COUNT: 17
ROLLING_OVER_IN_BED: MODERATE DIFFICULTY
WALKING_15_MINUTES_OR_GREATER: EXTREME DIFFICULTY
HOW_WOULD_YOU_RATE_YOUR_CURRENT_LEVEL_OF_FUNCTION_DURING_YOUR_USUAL_ACTIVITIES_OF_DAILY_LIVING_FROM_0_TO_100_WITH_100_BEING_YOUR_LEVEL_OF_FUNCTION_PRIOR_TO_YOUR_HIP_PROBLEM_AND_0_BEING_THE_INABILITY_TO_PERFORM_ANY_OF_YOUR_USUAL_DAILY_ACTIVITIES?: 50

## 2022-02-14 ENCOUNTER — THERAPY VISIT (OUTPATIENT)
Dept: PHYSICAL THERAPY | Facility: CLINIC | Age: 62
End: 2022-02-14
Attending: NURSE PRACTITIONER
Payer: COMMERCIAL

## 2022-02-14 DIAGNOSIS — M54.16 LUMBAR RADICULOPATHY: ICD-10-CM

## 2022-02-14 PROCEDURE — 97110 THERAPEUTIC EXERCISES: CPT | Mod: GP | Performed by: PHYSICAL THERAPIST

## 2022-02-14 PROCEDURE — 97530 THERAPEUTIC ACTIVITIES: CPT | Mod: GP | Performed by: PHYSICAL THERAPIST

## 2022-02-14 PROCEDURE — 97140 MANUAL THERAPY 1/> REGIONS: CPT | Mod: GP | Performed by: PHYSICAL THERAPIST

## 2022-02-21 ENCOUNTER — THERAPY VISIT (OUTPATIENT)
Dept: PHYSICAL THERAPY | Facility: CLINIC | Age: 62
End: 2022-02-21
Attending: NURSE PRACTITIONER
Payer: COMMERCIAL

## 2022-02-21 DIAGNOSIS — M54.16 LUMBAR RADICULOPATHY: ICD-10-CM

## 2022-02-21 PROCEDURE — 97110 THERAPEUTIC EXERCISES: CPT | Mod: GP | Performed by: PHYSICAL THERAPIST

## 2022-02-21 PROCEDURE — 97530 THERAPEUTIC ACTIVITIES: CPT | Mod: GP | Performed by: PHYSICAL THERAPIST

## 2022-03-01 ENCOUNTER — MYC MEDICAL ADVICE (OUTPATIENT)
Dept: PHYSICAL THERAPY | Facility: CLINIC | Age: 62
End: 2022-03-01
Payer: COMMERCIAL

## 2022-03-22 ENCOUNTER — MYC MEDICAL ADVICE (OUTPATIENT)
Dept: FAMILY MEDICINE | Facility: CLINIC | Age: 62
End: 2022-03-22
Payer: COMMERCIAL

## 2022-03-22 DIAGNOSIS — M54.16 LUMBAR RADICULOPATHY: Primary | ICD-10-CM

## 2022-03-24 NOTE — TELEPHONE ENCOUNTER
PT previously reached out to me via staff message regarding lack of progress in PT and that patient was going to reach out to us here at the clinic for follow-up.      MRI lumbar order placed.  Patient to call:  [phone] to get scheduled.      Previously placed spine specialty referral and instructed patient to follow-up with spine as well (with no improvement or worsening).  Please inform patient to schedule with spine as well, preferably after having MRI completed.     - JMeixl, CNP

## 2022-03-25 NOTE — TELEPHONE ENCOUNTER
Called and spoke with patient.     MRI scheduled for 4/15. Number given to patient to call for spine referral. Patient had no further questions or concerns.     Yanique Manning RN  [facility]

## 2022-03-28 DIAGNOSIS — Z00.00 ROUTINE GENERAL MEDICAL EXAMINATION AT A HEALTH CARE FACILITY: ICD-10-CM

## 2022-03-29 RX ORDER — ATORVASTATIN CALCIUM 40 MG/1
TABLET, FILM COATED ORAL
Qty: 90 TABLET | Refills: 0 | Status: SHIPPED | OUTPATIENT
Start: 2022-03-29 | End: 2022-04-17 | Stop reason: DRUGHIGH

## 2022-03-30 NOTE — TELEPHONE ENCOUNTER
Medication is being filled for 1 time refill only due to:  Patient needs labs .    Routing to MA/TC pool. The Pt is due for a visit with PCP for yearly visit and labs  Kwadwo KIDD RN, BSN

## 2022-04-11 ENCOUNTER — OFFICE VISIT (OUTPATIENT)
Dept: FAMILY MEDICINE | Facility: CLINIC | Age: 62
End: 2022-04-11
Payer: COMMERCIAL

## 2022-04-11 VITALS
HEIGHT: 70 IN | WEIGHT: 198 LBS | BODY MASS INDEX: 28.35 KG/M2 | HEART RATE: 80 BPM | SYSTOLIC BLOOD PRESSURE: 122 MMHG | TEMPERATURE: 97 F | DIASTOLIC BLOOD PRESSURE: 81 MMHG | OXYGEN SATURATION: 95 %

## 2022-04-11 DIAGNOSIS — Z12.11 SCREEN FOR COLON CANCER: ICD-10-CM

## 2022-04-11 DIAGNOSIS — R73.01 ELEVATED FASTING GLUCOSE: ICD-10-CM

## 2022-04-11 DIAGNOSIS — Z13.0 SCREENING FOR DEFICIENCY ANEMIA: ICD-10-CM

## 2022-04-11 DIAGNOSIS — I49.9 CARDIAC ARRHYTHMIA, UNSPECIFIED CARDIAC ARRHYTHMIA TYPE: ICD-10-CM

## 2022-04-11 DIAGNOSIS — Z00.00 ROUTINE GENERAL MEDICAL EXAMINATION AT A HEALTH CARE FACILITY: Primary | ICD-10-CM

## 2022-04-11 DIAGNOSIS — G47.30 SLEEP APNEA, UNSPECIFIED TYPE: ICD-10-CM

## 2022-04-11 DIAGNOSIS — Z82.49 FAMILY HISTORY OF ABDOMINAL AORTIC ANEURYSM (AAA): ICD-10-CM

## 2022-04-11 DIAGNOSIS — Z80.0 FAMILY HISTORY OF COLON CANCER: ICD-10-CM

## 2022-04-11 DIAGNOSIS — Z12.5 SCREENING FOR PROSTATE CANCER: ICD-10-CM

## 2022-04-11 DIAGNOSIS — E78.5 HYPERLIPIDEMIA LDL GOAL <130: ICD-10-CM

## 2022-04-11 DIAGNOSIS — N52.9 ERECTILE DYSFUNCTION, UNSPECIFIED ERECTILE DYSFUNCTION TYPE: ICD-10-CM

## 2022-04-11 DIAGNOSIS — K21.00 GASTROESOPHAGEAL REFLUX DISEASE WITH ESOPHAGITIS, UNSPECIFIED WHETHER HEMORRHAGE: ICD-10-CM

## 2022-04-11 DIAGNOSIS — Z13.29 SCREENING FOR THYROID DISORDER: ICD-10-CM

## 2022-04-11 LAB
ALBUMIN SERPL-MCNC: 3.9 G/DL (ref 3.4–5)
ALP SERPL-CCNC: 78 U/L (ref 40–150)
ALT SERPL W P-5'-P-CCNC: 60 U/L (ref 0–70)
ANION GAP SERPL CALCULATED.3IONS-SCNC: 5 MMOL/L (ref 3–14)
AST SERPL W P-5'-P-CCNC: 30 U/L (ref 0–45)
BILIRUB SERPL-MCNC: 0.5 MG/DL (ref 0.2–1.3)
BUN SERPL-MCNC: 17 MG/DL (ref 7–30)
CALCIUM SERPL-MCNC: 9.3 MG/DL (ref 8.5–10.1)
CHLORIDE BLD-SCNC: 106 MMOL/L (ref 94–109)
CHOLEST SERPL-MCNC: 176 MG/DL
CO2 SERPL-SCNC: 26 MMOL/L (ref 20–32)
CREAT SERPL-MCNC: 0.98 MG/DL (ref 0.66–1.25)
ERYTHROCYTE [DISTWIDTH] IN BLOOD BY AUTOMATED COUNT: 12.6 % (ref 10–15)
FASTING STATUS PATIENT QL REPORTED: YES
GFR SERPL CREATININE-BSD FRML MDRD: 88 ML/MIN/1.73M2
GLUCOSE BLD-MCNC: 109 MG/DL (ref 70–99)
HCT VFR BLD AUTO: 43.9 % (ref 40–53)
HDLC SERPL-MCNC: 34 MG/DL
HGB BLD-MCNC: 15 G/DL (ref 13.3–17.7)
LDLC SERPL CALC-MCNC: 87 MG/DL
LDLC SERPL CALC-MCNC: ABNORMAL MG/DL
MCH RBC QN AUTO: 29.1 PG (ref 26.5–33)
MCHC RBC AUTO-ENTMCNC: 34.2 G/DL (ref 31.5–36.5)
MCV RBC AUTO: 85 FL (ref 78–100)
NONHDLC SERPL-MCNC: 142 MG/DL
PLATELET # BLD AUTO: 243 10E3/UL (ref 150–450)
POTASSIUM BLD-SCNC: 4 MMOL/L (ref 3.4–5.3)
PROT SERPL-MCNC: 7.5 G/DL (ref 6.8–8.8)
RBC # BLD AUTO: 5.16 10E6/UL (ref 4.4–5.9)
SODIUM SERPL-SCNC: 137 MMOL/L (ref 133–144)
TRIGL SERPL-MCNC: 405 MG/DL
TSH SERPL DL<=0.005 MIU/L-ACNC: 3.01 MU/L (ref 0.4–4)
WBC # BLD AUTO: 7.3 10E3/UL (ref 4–11)

## 2022-04-11 PROCEDURE — 84443 ASSAY THYROID STIM HORMONE: CPT | Performed by: NURSE PRACTITIONER

## 2022-04-11 PROCEDURE — 99396 PREV VISIT EST AGE 40-64: CPT | Performed by: NURSE PRACTITIONER

## 2022-04-11 PROCEDURE — 80061 LIPID PANEL: CPT | Performed by: NURSE PRACTITIONER

## 2022-04-11 PROCEDURE — G0103 PSA SCREENING: HCPCS | Performed by: NURSE PRACTITIONER

## 2022-04-11 PROCEDURE — 80053 COMPREHEN METABOLIC PANEL: CPT | Performed by: NURSE PRACTITIONER

## 2022-04-11 PROCEDURE — 85027 COMPLETE CBC AUTOMATED: CPT | Performed by: NURSE PRACTITIONER

## 2022-04-11 PROCEDURE — 99214 OFFICE O/P EST MOD 30 MIN: CPT | Mod: 25 | Performed by: NURSE PRACTITIONER

## 2022-04-11 PROCEDURE — 83036 HEMOGLOBIN GLYCOSYLATED A1C: CPT | Performed by: NURSE PRACTITIONER

## 2022-04-11 PROCEDURE — 36415 COLL VENOUS BLD VENIPUNCTURE: CPT | Performed by: NURSE PRACTITIONER

## 2022-04-11 RX ORDER — SILDENAFIL 50 MG/1
50 TABLET, FILM COATED ORAL DAILY PRN
Qty: 90 TABLET | Refills: 6 | Status: SHIPPED | OUTPATIENT
Start: 2022-04-11 | End: 2023-05-15

## 2022-04-11 ASSESSMENT — ENCOUNTER SYMPTOMS
JOINT SWELLING: 0
COUGH: 0
EYE PAIN: 0
DYSURIA: 0
PALPITATIONS: 0
ARTHRALGIAS: 1
NAUSEA: 0
WEAKNESS: 0
CHILLS: 0
HEMATURIA: 0
MYALGIAS: 0
HEMATOCHEZIA: 0
ABDOMINAL PAIN: 0
FREQUENCY: 0
NERVOUS/ANXIOUS: 0
HEARTBURN: 1
SHORTNESS OF BREATH: 0
PARESTHESIAS: 0
FEVER: 0
HEADACHES: 0
SORE THROAT: 0
DIZZINESS: 0
CONSTIPATION: 0
DIARRHEA: 0

## 2022-04-11 NOTE — PROGRESS NOTES
SUBJECTIVE:   CC: Maximino Linares is an 62 year old male who presents for preventative health visit.     Patient has been advised of split billing requirements and indicates understanding: Yes  Healthy Habits:     Getting at least 3 servings of Calcium per day:  Yes    Bi-annual eye exam:  NO    Dental care twice a year:  Yes    Sleep apnea or symptoms of sleep apnea:  Excessive snoring and Sleep apnea    Diet:  Regular (no restrictions)    Frequency of exercise:  2-3 days/week    Duration of exercise:  15-30 minutes    Taking medications regularly:  Yes    Medication side effects:  Other    PHQ-2 Total Score: 0    Additional concerns today:  Yes      Previous Prilosec for years for GERD.  Saw a new provider in he recommended him coming off his PPI.  Increase his symptoms recently.  Has not had an upper endoscopy ever.  x4-5 months - acid reflux more prevalent, stomach has been gurgling. Tries to eat a little through out the day. Some days gurgling is worse than other. Acid reflux varies day to day, different foods effect it.  Stools are softer at times than others - when eats greasier foods -- has not has that issue in the past.  Dad has gallbladder disease with recent removal of gallbladder.    10/2019 at home sleep apnea +; had [employer] Dream machine and it was recalled. Cant get a new one until the company allows him to get this; we can order him a new one because his insurance will only pay ever 5 years.  He will let me know if this changes or progress needs a new sleep study to facilitate new CPAP machine.    ED works well with Viagra 50 mg dose however its extremely expensive he has been rationing it taking half to quarter tablets just to keep it available to him.  Generally needs the 50 mg dose to be effective however.  Cost is approximately 50+ dollars for 6 pills.  Has never use good Rx or a different pharmacy to compare cost.    Family history of colon/rectal cancer in mother.  Due for colonoscopy.    Has  had a second shingles vaccine he does need to get us the date.    Family history of AAA has had screening in 2019 per review of his past medical notes; I have added this into our notes to check again; he would like to check this again next year.    Wt Readings from Last 5 Encounters:   04/11/22 89.8 kg (198 lb)   01/25/22 90.7 kg (200 lb)   03/05/21 89.4 kg (197 lb)       Hyperlipidemia Follow-Up      Are you regularly taking any medication or supplement to lower your cholesterol?   Yes- Atorvastatin    Are you having muscle aches or other side effects that you think could be caused by your cholesterol lowering medication?  No    Today's PHQ-2 Score:   PHQ-2 ( 1999 [employer]) 4/11/2022   Q1: Little interest or pleasure in doing things 0   Q2: Feeling down, depressed or hopeless 0   PHQ-2 Score 0   PHQ-2 Total Score (12-17 Years)- Positive if 3 or more points; Administer PHQ-A if positive -   Q1: Little interest or pleasure in doing things Not at all   Q2: Feeling down, depressed or hopeless Not at all   PHQ-2 Score 0     Abuse: Current or Past(Physical, Sexual or Emotional)- No  Do you feel safe in your environment? Yes    Social History     Tobacco Use     Smoking status: Never Smoker     Smokeless tobacco: Former User     Types: Chew   Substance Use Topics     Alcohol use: Yes     Alcohol/week: 1.0 - 2.0 standard drink     Types: 1 - 2 Standard drinks or equivalent per week     Comment: 1-2 per week     If you drink alcohol do you typically have >3 drinks per day or >7 drinks per week? No    Alcohol Use 4/11/2022   Prescreen: >3 drinks/day or >7 drinks/week? No   Prescreen: >3 drinks/day or >7 drinks/week? -       Last PSA:   PSA   Date Value Ref Range Status   03/05/2021 0.84 0 - 4 ug/L Final     Comment:     Assay Method:  Chemiluminescence using Siemens Vista analyzer     Reviewed orders with patient. Reviewed health maintenance and updated orders accordingly - Yes  Lab work is in process  Labs reviewed in  EPIC  BP Readings from Last 3 Encounters:   04/11/22 122/81   01/25/22 120/80   05/14/21 122/80    Wt Readings from Last 3 Encounters:   04/11/22 89.8 kg (198 lb)   01/25/22 90.7 kg (200 lb)   03/05/21 89.4 kg (197 lb)           Patient Active Problem List   Diagnosis     Hyperlipidemia LDL goal <130     History of skin cancer     Cardiac arrhythmia, unspecified cardiac arrhythmia type     Lumbar radiculopathy     Past Surgical History:   Procedure Laterality Date     SURGICAL HISTORY OF -  Right     x 2, 2000, 2005, arthroscopy, ACL reconstruction     SURGICAL HISTORY OF -   2017    Deviated septum/soft palpate repair       Social History     Tobacco Use     Smoking status: Never Smoker     Smokeless tobacco: Former User     Types: Chew   Substance Use Topics     Alcohol use: Yes     Alcohol/week: 1.0 - 2.0 standard drink     Types: 1 - 2 Standard drinks or equivalent per week     Comment: 1-2 per week     Family History   Problem Relation Age of Onset     Colon Cancer Mother 68     Rectal Cancer Mother      Abdominal Aortic Aneurysm Father      Bladder Cancer Father 75     Gallbladder Disease Father      Arrhythmia Sister      Heart Failure Maternal Grandmother      Lung Cancer Maternal Grandfather         smoker     Heart Failure Paternal Grandmother      Alcoholism Paternal Grandfather      Obesity Paternal Grandfather      Abdominal Aortic Aneurysm Paternal Uncle          Current Outpatient Medications   Medication Sig Dispense Refill     atorvastatin (LIPITOR) 40 MG tablet TAKE 1 TABLET(40 MG) BY MOUTH DAILY 90 tablet 0     sildenafil (VIAGRA) 50 MG tablet Take 1 tablet (50 mg) by mouth as needed in the morning (30-60 minutes before intercourse). 90 tablet 6     Allergies   Allergen Reactions     Morphine Nausea and Vomiting       Reviewed and updated as needed this visit by clinical staff   Tobacco  Allergies  Meds  Problems  Med Hx  Surg Hx  Fam Hx  Soc   Hx          Reviewed and updated as needed  "this visit by Provider   Tobacco  Allergies  Meds  Problems  Med Hx  Surg Hx  Fam Hx             Review of Systems   Constitutional: Negative for chills and fever.   HENT: Positive for hearing loss. Negative for congestion, ear pain and sore throat.    Eyes: Negative for pain and visual disturbance.   Respiratory: Negative for cough and shortness of breath.    Cardiovascular: Negative for chest pain, palpitations and peripheral edema.   Gastrointestinal: Positive for heartburn. Negative for abdominal pain, constipation, diarrhea, hematochezia and nausea.   Genitourinary: Positive for impotence. Negative for dysuria, frequency, genital sores, hematuria, penile discharge and urgency.   Musculoskeletal: Positive for arthralgias. Negative for joint swelling and myalgias.   Skin: Negative for rash.   Neurological: Negative for dizziness, weakness, headaches and paresthesias.   Psychiatric/Behavioral: Negative for mood changes. The patient is not nervous/anxious.      OBJECTIVE:   /81 (BP Location: Right arm, Patient Position: Chair, Cuff Size: Adult Large)   Pulse 80   Temp 97  F (36.1  C) (Tympanic)   Ht 1.784 m (5' 10.25\")   Wt 89.8 kg (198 lb)   SpO2 95%   BMI 28.21 kg/m      Physical Exam  GENERAL: healthy, alert and no distress, very pleasant.  EYES: Eyes grossly normal to inspection, PERRL and conjunctivae and sclerae normal  HENT: ear canals and TM's normal, nose and mouth without ulcers or lesions  NECK: no adenopathy, no asymmetry, masses, or scars and thyroid normal to palpation  RESP: lungs clear to auscultation - no rales, rhonchi or wheezes  CV: regular rate and rhythm, normal S1 S2, no S3 or S4, no murmur, click or rub, no peripheral edema and peripheral pulses strong  ABDOMEN: soft, nontender, no hepatosplenomegaly, no masses and bowel sounds normal  MS: no gross musculoskeletal defects noted, no edema  SKIN: no suspicious lesions or rashes  NEURO: Normal strength and tone, mentation " intact and speech normal  PSYCH: mentation appears normal, affect normal/bright    Diagnostic Test Results:  Labs reviewed in Epic    ASSESSMENT/PLAN:   Maximino was seen today for physical.    Diagnoses and all orders for this visit:    Routine general medical examination at a health care facility  Wellness exam completed today.    Fasting labs today.    Will notify of lab results.  -     REVIEW OF HEALTH MAINTENANCE PROTOCOL ORDERS    Hyperlipidemia LDL goal <130  Has been stable we will check labs today and fill medications for a year adjust if needed.  -     Lipid panel reflex to direct LDL Fasting; Future  -     Comprehensive metabolic panel (BMP + Alb, Alk Phos, ALT, AST, Total. Bili, TP); Future  -     Lipid panel reflex to direct LDL Fasting  -     Comprehensive metabolic panel (BMP + Alb, Alk Phos, ALT, AST, Total. Bili, TP)  -     OFFICE/OUTPT VISIT,EST,LEVL IV    Erectile dysfunction, unspecified erectile dysfunction type  No concerns.  Stable.  Continue same medication this was refilled today.  Found huge cost savings for him if he gets this filled at [facility] I have sent a 90 pill supply and given him a good Rx coupon for [facility] which is for 90 tablets approx 50 dollars without being run through insurance versus his $50 cost for 6 tablets.  He is very happy with this.  Encouraged him to get good Rx on his phone and to always have his Viagra filled at [facility].  -     sildenafil (VIAGRA) 50 MG tablet; Take 1 tablet (50 mg) by mouth as needed in the morning (30-60 minutes before intercourse).  -     OFFICE/OUTPT VISIT,EST,LEVL IV    Gastroesophageal reflux disease with esophagitis, unspecified whether hemorrhage  Has never had an upper endoscopy will complete that this year with his lower colonoscopy to treat accordingly may need lifelong PPI.  At this time avoid trigger foods.  Also possible he has some gallbladder symptoms will complete abdominal ultrasound if symptoms persist.  -     Adult Gastro Ref -  "Procedure Only; Future  -     OFFICE/OUTPT VISIT,BRIDGET LAWL IV    Family history of abdominal aortic aneurysm (AAA)  Recheck next year.  Comments:  Last US 5/2019 WNL  Orders:  -     OFFICE/OUTPT VISIT,EST,LEVL GIANA    Cardiac arrhythmia, unspecified cardiac arrhythmia type  No concerns with this at this time unable to review previous records he reports this is not a significant concern nor was it life-threatening and it is rare that happens feels a palpitation type sensation.  Based on description likely of premature beats versus SVT he will let us know if it is worsening.  -     OFFICE/OUTPT VISIT,EST,LEVL IV    Sleep apnea, unspecified type  He will let me know if I can order him a new machine or sleep study to get him a new machine if he is not getting it from the company where it has been recalled.  Comments:  Mild had dream machine CPAP; recalled waiting for new machine.  Orders:  -     OFFICE/OUTPT VISIT,BRIDGET LAWL IV    Family history of colon cancer  -     Adult Gastro Ref - Procedure Only; Future    Screen for colon cancer  -     Cancel: Adult Gastro Ref - Procedure Only; Future  -     Adult Gastro Ref - Procedure Only; Future    Screening for deficiency anemia  -     CBC with platelets; Future  -     CBC with platelets    Screening for thyroid disorder  -     TSH with free T4 reflex; Future  -     TSH with free T4 reflex    Screening for prostate cancer  -     PSA, screen; Future  -     PSA, screen    Patient has been advised of split billing requirements and indicates understanding: Yes    COUNSELING:   Reviewed preventive health counseling, as reflected in patient instructions       Regular exercise       Healthy diet/nutrition    Estimated body mass index is 28.21 kg/m  as calculated from the following:    Height as of this encounter: 1.784 m (5' 10.25\").    Weight as of this encounter: 89.8 kg (198 lb).     Weight management plan: Discussed healthy diet and exercise guidelines    He reports that he has " never smoked. He has quit using smokeless tobacco.  His smokeless tobacco use included chew.      Counseling Resources:  ATP IV Guidelines  Pooled Cohorts Equation Calculator  FRAX Risk Assessment  ICSI Preventive Guidelines  Dietary Guidelines for Americans, 2010  USDA's MyPlate  ASA Prophylaxis  Lung CA Screening      Stacy Gallegos, KAREN CNP  [facility]

## 2022-04-12 LAB — PSA SERPL-MCNC: 1.17 UG/L (ref 0–4)

## 2022-04-13 LAB — HBA1C MFR BLD: 5.6 % (ref 0–5.6)

## 2022-04-14 ENCOUNTER — TELEPHONE (OUTPATIENT)
Dept: FAMILY MEDICINE | Facility: CLINIC | Age: 62
End: 2022-04-14
Payer: COMMERCIAL

## 2022-04-15 ENCOUNTER — HOSPITAL ENCOUNTER (OUTPATIENT)
Dept: MRI IMAGING | Facility: CLINIC | Age: 62
Discharge: HOME | End: 2022-04-15
Attending: NURSE PRACTITIONER | Admitting: NURSE PRACTITIONER
Payer: COMMERCIAL

## 2022-04-15 DIAGNOSIS — M54.16 LUMBAR RADICULOPATHY: ICD-10-CM

## 2022-04-15 DIAGNOSIS — M54.16 LUMBAR RADICULOPATHY: Primary | ICD-10-CM

## 2022-04-15 DIAGNOSIS — M47.816 SPONDYLOSIS OF LUMBAR SPINE: ICD-10-CM

## 2022-04-15 DIAGNOSIS — M48.00 SPINAL STENOSIS, UNSPECIFIED SPINAL REGION: ICD-10-CM

## 2022-04-15 PROCEDURE — 72148 MRI LUMBAR SPINE W/O DYE: CPT

## 2022-04-15 NOTE — RESULT ENCOUNTER NOTE
Dear Maximino,     I attempted to reach you via telephone to discuss your MRI, but didn't catch you.  Here are the results of your recent MRI.  There was notation of multi-level spondylosis (which is degenerative or arthritic/age related changes) and stenosis (narrowing) at varied levels in your spine.  I feel as though it would be beneficial for you to have a consultation with a spine specialist.  I placed the referral and here is their contact information:      [phone]    Here is your full radiology interpretation:      IMPRESSION:     1. Multilevel lumbar spondylosis, most pronounced at the L5-S1 level  where there is a symmetric disc osteophyte complex that results in  moderately severe spinal canal stenosis with subarticular zone  narrowing that may affect the traversing S1 nerve root bilaterally, as  well as moderate to severe right foraminal stenosis and moderate left  foraminal stenosis.  2. Moderate spinal canal stenosis at L4-L5 with subarticular zone  narrowing that may affect the traversing L5 nerve roots.  3. Less significant lumbar spondylotic changes as described level by  level in the body of the report.    Please send a Voddler message or call [phone]  if you have any questions.      Symone Cutler, APRN, CNP  [facility]

## 2022-04-17 RX ORDER — ATORVASTATIN CALCIUM 80 MG/1
80 TABLET, FILM COATED ORAL DAILY
Qty: 90 TABLET | Refills: 3 | Status: SHIPPED | OUTPATIENT
Start: 2022-04-17 | End: 2022-07-20 | Stop reason: ALTCHOICE

## 2022-04-18 NOTE — RESULT ENCOUNTER NOTE
Please call Maximino with med change.    Here is a summary of your recent test results:    -LDL(bad) cholesterol level is elevated, HDL(good) cholesterol level is low and your triglycerides are elevated which can increase your heart disease risk.  A diet high in fat and simple carbohydrates, genetics and being overweight can contribute to this. ADVISE: exercising 150 minutes of aerobic exercise per week (30 minutes for 5 days per week or 50 minutes for 3 days per week are options), eating a low saturated fat/low carbohydrate diet and your statin medication are helpful to improve this. Let's increase your statin dose to 80 mg daily. I am sending a new prescription to your pharmacy: atorvastatin (Lipitor) 80 mg he can take 2 nightly of his current med before picking up the new script.  In 3 months, you should recheck your fasting cholesterol panel by scheduling a lab-only appointment.  Glucose is slight elevated and may be a sign of early diabetes (prediabetes). ADVISE:: eating a low carbohydrate diet, exercising, trying to lose weight (if necessary) and rechecking your glucose level in 12 months.    CAMRYN Funes  [facility]

## 2022-04-29 ENCOUNTER — OFFICE VISIT (OUTPATIENT)
Dept: NEUROSURGERY | Facility: CLINIC | Age: 62
End: 2022-04-29
Attending: NURSE PRACTITIONER
Payer: COMMERCIAL

## 2022-04-29 VITALS
SYSTOLIC BLOOD PRESSURE: 145 MMHG | HEART RATE: 80 BPM | HEIGHT: 70 IN | WEIGHT: 198 LBS | DIASTOLIC BLOOD PRESSURE: 98 MMHG | OXYGEN SATURATION: 96 % | BODY MASS INDEX: 28.35 KG/M2

## 2022-04-29 DIAGNOSIS — M54.16 LUMBAR RADICULOPATHY: Primary | ICD-10-CM

## 2022-04-29 DIAGNOSIS — M48.00 SPINAL STENOSIS, UNSPECIFIED SPINAL REGION: ICD-10-CM

## 2022-04-29 DIAGNOSIS — M47.816 SPONDYLOSIS OF LUMBAR SPINE: ICD-10-CM

## 2022-04-29 PROCEDURE — 99203 OFFICE O/P NEW LOW 30 MIN: CPT | Performed by: PHYSICIAN ASSISTANT

## 2022-04-29 PROCEDURE — G0463 HOSPITAL OUTPT CLINIC VISIT: HCPCS

## 2022-04-29 RX ORDER — METHYLPREDNISOLONE 4 MG
TABLET, DOSE PACK ORAL
Qty: 21 TABLET | Refills: 0 | Status: SHIPPED | OUTPATIENT
Start: 2022-04-29 | End: 2022-07-20

## 2022-04-29 ASSESSMENT — PAIN SCALES - GENERAL: PAINLEVEL: MILD PAIN (2)

## 2022-04-29 NOTE — PROGRESS NOTES
NEUROSURGERY CLINIC CONSULT NOTE     DATE OF VISIT: 4/29/2022     SUBJECTIVE:     Maximino Linares is a pleasant 62 year old male who presents to the clinic today for consultation on lumbar spine pain with left leg radiculopthy. He is referred to the Neurosurgery Clinic by Dr. Cutlre in Primary Care.   Today, he reports a four-month history of symptoms. He describes an intermittent, sharp, aching,  pain that initiates in the midline low lumbar region and radiates distally in what sounds like the left L5 distribution. This pain is accompanied by paresthesia and perceived weakness in the same distribution. Prolonged walking and standing aggravate the symptoms, while alleviation is obtained by sitting and lying down. A fall on his deck is associated with the onset of the pain. There are no bowel or bladder changes. He denies saddle anesthesia.   He has participated in conservative therapies to include physical therapy, traction, chiropractic care, and NSAID's. None of these modalities have provided any significant long term relief.          Current Outpatient Medications:      methylPREDNISolone (MEDROL DOSEPAK) 4 MG tablet therapy pack, Follow Package Directions, Disp: 21 tablet, Rfl: 0     atorvastatin (LIPITOR) 80 MG tablet, Take 1 tablet (80 mg) by mouth daily, Disp: 90 tablet, Rfl: 3     sildenafil (VIAGRA) 50 MG tablet, Take 1 tablet (50 mg) by mouth as needed in the morning (30-60 minutes before intercourse)., Disp: 90 tablet, Rfl: 6     Allergies   Allergen Reactions     Morphine Nausea and Vomiting        Past Medical History:   Diagnosis Date     Cardiac arrhythmia, unspecified cardiac arrhythmia type     benign, extensive work up     History of skin cancer     SCC, BCC     Hyperlipidemia LDL goal <130      Mild intermittent asthma without complication     resolved        ROS: 10 point review of symptoms are negative other than the symptoms noted above in the HPI.     Family History has been reviewed with the  "patient, there are no pertinent findings to presenting concern.     Past Surgical History:   Procedure Laterality Date     SURGICAL HISTORY OF -  Right     x 2, 2000, 2005, arthroscopy, ACL reconstruction     SURGICAL HISTORY OF -   2017    Deviated septum/soft palpate repair        Social History     Tobacco Use     Smoking status: Never Smoker     Smokeless tobacco: Former User     Types: Chew   Substance Use Topics     Alcohol use: Yes     Alcohol/week: 1.0 - 2.0 standard drink     Types: 1 - 2 Standard drinks or equivalent per week     Comment: 1-2 per week     Drug use: Not Currently        OBJECTIVE:   BP (!) 145/98   Pulse 80   Ht 5' 10.25\" (1.784 m)   Wt 198 lb (89.8 kg)   SpO2 96%   BMI 28.21 kg/m     Body mass index is 28.21 kg/m .     Imaging:     MRI OF THE LUMBAR SPINE WITHOUT CONTRAST 4/15/2022 9:43 AM     Findings, per radiologist read, notable for:      1. Multilevel lumbar spondylosis, most pronounced at the L5-S1 level  where there is a symmetric disc osteophyte complex that results in  moderately severe spinal canal stenosis with subarticular zone  narrowing that may affect the traversing S1 nerve root bilaterally, as  well as moderate to severe right foraminal stenosis and moderate left  foraminal stenosis.  2. Moderate spinal canal stenosis at L4-L5 with subarticular zone  narrowing that may affect the traversing L5 nerve roots.  3. Less significant lumbar spondylotic changes as described level by  level in the body of the report.    Full radiological report in chart. Imaging was reviewed with with patient today.     Exam:     Patient appears comfortable, conversational, and in no apparent distress.   Head: Normocephalic, without obvious abnormality, atraumatic, no facial asymmetry.   Eyes: conjunctivae/corneas clear. PERRL, EOM's intact.   Throat: lips, mucosa, and tongue normal; teeth and gums normal.   Neck: supple, symmetrical, trachea midline, no adenopathy and thyroid: not enlarged, " symmetric, no tenderness/mass/nodules.   Lungs: clear to auscultation bilaterally.   Heart: regular rate and rhythm.   Abdomen: soft, non-tender; bowel sounds normal; no masses, no organomegaly.   Pulses: 2+ and symmetric.   Skin: Skin color, texture, turgor normal. No rashes or lesions.     CN II-XII grossly intact, alert and appropriate with conversation and following commands.   Gait is non-antalgic. Able to tandem walk. Able to walk on toes and heels without difficulty.   Cervical spine is non tender to palpation. Appropriate range of motion of neck, not concerning for lhermitte's phenomenon.   Bilateral bicep 2/4 and tricep reflexes 1/4. Sensation intact throughout upper extremities.     UE muscle strength  Right  Left    Deltoid  5/5  5/5    Biceps  5/5  5/5    Triceps  5/5  5/5    Hand intrinsics  5/5  5/5    Hand grasp  5/5  5/5    Dorado signs  neg  neg      Lumbar spine is non tender to palpation.  Intact sensation throughout lower extremities.   Bilateral patellar 2/4 and achilles reflex 1/4. Negative for pain with straight leg raise.     LE muscle strength  Right  Left    Iliopsoas (hip flexion)  5/5  5/5    Quad (knee extension)  5/5  5/5    Hamstring (knee flexion)  5/5  5/5    Gastrocnemius (PF)  5/5  5/5    Tibialis Ant. (DF)  5/5  5/5    EHL  5/5  5/5      Negative Babinski bilaterally. Negative for clonus.   Calves are soft and non-tender bilaterally.     ASSESSMENT/PLAN:     Maximino Linares is a 62 year old male who presents to the clinic for consultation on four-month history of an intermittent, sharp, aching,  pain that initiates in the midline low lumbar region and radiates distally in what sounds like the left L5 distribution. This pain is accompanied by paresthesia and perceived weakness in the same distribution. The patient's most recent imaging was reviewed with him today. It was explained that images show multilevel lumbar spondylosis, most pronounced at the L5-S1 level where there is a  symmetric disc osteophyte complex that results in moderately severe spinal canal stenosis with subarticular zone narrowing that may affect the traversing S1 nerve root bilaterally, as well as moderate to severe right foraminal stenosis and moderate left foraminal stenosis.  More specifically he also has moderate spinal canal stenosis at L4-L5 with subarticular zone  narrowing that may affect the traversing L5 nerve roots, which correlates to today's clinical examination. On exam, he is noted to have appropriate strength, sensation and range of motion. He has attempted conservative management with physical therapy, traction, chiropractic care, and NSAID's, without resolution of symptoms.     Based on his physical exam, imaging review, and past treatments, we feel that it would be in his best interest to continue a conservative approach by participating in his physical therapy program. We have also provided him with a prescription for a MDP.     We would like to see him back as needed to further discuss possible surgical interventions or other conservative therapies such as an epidural steroid injection. We also discussed signs of a worsening problem that he should seek being evaluated.        Respectfully,     ARIADNE Mota, PAERICA  [facility] Neurosurgery  [facility]  Tel: [phone]      Exam, imaging, and plan reviewed by Dr. Abel.

## 2022-04-29 NOTE — LETTER
4/29/2022         RE: Maximino Linares  [address] [facility] [id number]        Dear Colleague,    Thank you for referring your patient, Maximino Linares, to the [facility] [place]. Please see a copy of my visit note below.    NEUROSURGERY CLINIC CONSULT NOTE     DATE OF VISIT: 4/29/2022     SUBJECTIVE:     Maximino Linares is a pleasant 62 year old male who presents to the clinic today for consultation on lumbar spine pain with left leg radiculopthy. He is referred to the Neurosurgery Clinic by Dr. Cutler in Primary Care.   Today, he reports a four-month history of symptoms. He describes an intermittent, sharp, aching,  pain that initiates in the midline low lumbar region and radiates distally in what sounds like the left L5 distribution. This pain is accompanied by paresthesia and perceived weakness in the same distribution. Prolonged walking and standing aggravate the symptoms, while alleviation is obtained by sitting and lying down. A fall on his deck is associated with the onset of the pain. There are no bowel or bladder changes. He denies saddle anesthesia.   He has participated in conservative therapies to include physical therapy, traction, chiropractic care, and NSAID's. None of these modalities have provided any significant long term relief.          Current Outpatient Medications:      methylPREDNISolone (MEDROL DOSEPAK) 4 MG tablet therapy pack, Follow Package Directions, Disp: 21 tablet, Rfl: 0     atorvastatin (LIPITOR) 80 MG tablet, Take 1 tablet (80 mg) by mouth daily, Disp: 90 tablet, Rfl: 3     sildenafil (VIAGRA) 50 MG tablet, Take 1 tablet (50 mg) by mouth as needed in the morning (30-60 minutes before intercourse)., Disp: 90 tablet, Rfl: 6     Allergies   Allergen Reactions     Morphine Nausea and Vomiting        Past Medical History:   Diagnosis Date     Cardiac arrhythmia, unspecified cardiac arrhythmia type     benign, extensive work up      "History of skin cancer     SCC, BCC     Hyperlipidemia LDL goal <130      Mild intermittent asthma without complication     resolved        ROS: 10 point review of symptoms are negative other than the symptoms noted above in the HPI.     Family History has been reviewed with the patient, there are no pertinent findings to presenting concern.     Past Surgical History:   Procedure Laterality Date     SURGICAL HISTORY OF -  Right     x 2, 2000, 2005, arthroscopy, ACL reconstruction     SURGICAL HISTORY OF -   2017    Deviated septum/soft palpate repair        Social History     Tobacco Use     Smoking status: Never Smoker     Smokeless tobacco: Former User     Types: Chew   Substance Use Topics     Alcohol use: Yes     Alcohol/week: 1.0 - 2.0 standard drink     Types: 1 - 2 Standard drinks or equivalent per week     Comment: 1-2 per week     Drug use: Not Currently        OBJECTIVE:   BP (!) 145/98   Pulse 80   Ht 5' 10.25\" (1.784 m)   Wt 198 lb (89.8 kg)   SpO2 96%   BMI 28.21 kg/m     Body mass index is 28.21 kg/m .     Imaging:     MRI OF THE LUMBAR SPINE WITHOUT CONTRAST 4/15/2022 9:43 AM     Findings, per radiologist read, notable for:      1. Multilevel lumbar spondylosis, most pronounced at the L5-S1 level  where there is a symmetric disc osteophyte complex that results in  moderately severe spinal canal stenosis with subarticular zone  narrowing that may affect the traversing S1 nerve root bilaterally, as  well as moderate to severe right foraminal stenosis and moderate left  foraminal stenosis.  2. Moderate spinal canal stenosis at L4-L5 with subarticular zone  narrowing that may affect the traversing L5 nerve roots.  3. Less significant lumbar spondylotic changes as described level by  level in the body of the report.    Full radiological report in chart. Imaging was reviewed with with patient today.     Exam:     Patient appears comfortable, conversational, and in no apparent distress.   Head: " Normocephalic, without obvious abnormality, atraumatic, no facial asymmetry.   Eyes: conjunctivae/corneas clear. PERRL, EOM's intact.   Throat: lips, mucosa, and tongue normal; teeth and gums normal.   Neck: supple, symmetrical, trachea midline, no adenopathy and thyroid: not enlarged, symmetric, no tenderness/mass/nodules.   Lungs: clear to auscultation bilaterally.   Heart: regular rate and rhythm.   Abdomen: soft, non-tender; bowel sounds normal; no masses, no organomegaly.   Pulses: 2+ and symmetric.   Skin: Skin color, texture, turgor normal. No rashes or lesions.     CN II-XII grossly intact, alert and appropriate with conversation and following commands.   Gait is non-antalgic. Able to tandem walk. Able to walk on toes and heels without difficulty.   Cervical spine is non tender to palpation. Appropriate range of motion of neck, not concerning for lhermitte's phenomenon.   Bilateral bicep 2/4 and tricep reflexes 1/4. Sensation intact throughout upper extremities.     UE muscle strength  Right  Left    Deltoid  5/5  5/5    Biceps  5/5  5/5    Triceps  5/5  5/5    Hand intrinsics  5/5  5/5    Hand grasp  5/5  5/5    Dorado signs  neg  neg      Lumbar spine is non tender to palpation.  Intact sensation throughout lower extremities.   Bilateral patellar 2/4 and achilles reflex 1/4. Negative for pain with straight leg raise.     LE muscle strength  Right  Left    Iliopsoas (hip flexion)  5/5  5/5    Quad (knee extension)  5/5  5/5    Hamstring (knee flexion)  5/5  5/5    Gastrocnemius (PF)  5/5  5/5    Tibialis Ant. (DF)  5/5  5/5    EHL  5/5  5/5      Negative Babinski bilaterally. Negative for clonus.   Calves are soft and non-tender bilaterally.     ASSESSMENT/PLAN:     Maximino Linares is a 62 year old male who presents to the clinic for consultation on four-month history of an intermittent, sharp, aching,  pain that initiates in the midline low lumbar region and radiates distally in what sounds like the left  L5 distribution. This pain is accompanied by paresthesia and perceived weakness in the same distribution. The patient's most recent imaging was reviewed with him today. It was explained that images show multilevel lumbar spondylosis, most pronounced at the L5-S1 level where there is a symmetric disc osteophyte complex that results in moderately severe spinal canal stenosis with subarticular zone narrowing that may affect the traversing S1 nerve root bilaterally, as well as moderate to severe right foraminal stenosis and moderate left foraminal stenosis.  More specifically he also has moderate spinal canal stenosis at L4-L5 with subarticular zone  narrowing that may affect the traversing L5 nerve roots, which correlates to today's clinical examination. On exam, he is noted to have appropriate strength, sensation and range of motion. He has attempted conservative management with physical therapy, traction, chiropractic care, and NSAID's, without resolution of symptoms.     Based on his physical exam, imaging review, and past treatments, we feel that it would be in his best interest to continue a conservative approach by participating in his physical therapy program. We have also provided him with a prescription for a MDP.     We would like to see him back as needed to further discuss possible surgical interventions or other conservative therapies such as an epidural steroid injection. We also discussed signs of a worsening problem that he should seek being evaluated.        Respectfully,     ARIADNE Mota, DARNELL  [facility] Neurosurgery  [facility]  Tel: [phone]      Exam, imaging, and plan reviewed by Dr. Abel.       Again, thank you for allowing me to participate in the care of your patient.        Sincerely,        Mario Barron PA-C

## 2022-04-29 NOTE — NURSING NOTE
"Maximino Linares is a 62 year old male who presents for:  Chief Complaint   Patient presents with     Consult     Lumbar radiculopathy, spondylosis, a& spinal stenosis        Initial Vitals:  BP (!) 145/98   Pulse 80   Ht 5' 10.25\" (1.784 m)   Wt 198 lb (89.8 kg)   SpO2 96%   BMI 28.21 kg/m   Estimated body mass index is 28.21 kg/m  as calculated from the following:    Height as of this encounter: 5' 10.25\" (1.784 m).    Weight as of this encounter: 198 lb (89.8 kg).. Body surface area is 2.11 meters squared. BP completed using cuff size: large  Mild Pain (2)    Nursing Comments:     Alphonso Díaz MA    "

## 2022-06-01 PROBLEM — M54.16 LUMBAR RADICULOPATHY: Status: RESOLVED | Noted: 2022-02-08 | Resolved: 2022-06-01

## 2022-06-01 NOTE — PROGRESS NOTES
DISCHARGE REPORT    Progress reporting period is from 2-7-22 to 2-21-22.       SUBJECTIVE  Subjective changes noted by patient:  .  Subjective: Pt reports feeling worse today. He has had to be on his feet more the last few days and the pain is much more increased than previously with prolonged walking. Feeling more of a buzzing rather than tingling now. Denies drop foot, has felt feelings of giving out in his knee.     Current pain level is 6/10  .     Previous pain level was  6/10 Initial Pain level: 6/10.   Changes in function:  None  Adverse reaction to treatment or activity: None    OBJECTIVE  Changes noted in objective findings:  The objective findings below are from DOS 2-21-22.  Objective: Positive L slump. MMT L LE: great toe 4+/5, dorsiflexion 4/5, dorsiflexion and eversion 4-/5, gastroc 4/5, 5/5 quad. Lx ROM: ext=mod loss, flex=min loss produces left posterior thigh to ankle, L SG= min-nil loss L hip pain.      ASSESSMENT/PLAN  Updated problem list and treatment plan: Diagnosis 1:  Lumbar radiculopathy  Pain -  manual therapy, self management, education, directional preference exercise and home program  Decreased ROM/flexibility - manual therapy and therapeutic exercise  Decreased joint mobility - manual therapy and therapeutic exercise  Decreased strength - therapeutic exercise and therapeutic activities  Decreased function - therapeutic activities  Impaired posture - neuro re-education  STG/LTGs have been met or progress has been made towards goals:  Minimal progress  Assessment of Progress: The patient has not returned to therapy. Current status is unknown.  Self Management Plans:  Patient is independent in a home treatment program.  Patient is independent in self management of symptoms.  I have re-evaluated this patient and find that the nature, scope, duration and intensity of the therapy is appropriate for the medical condition of the patient.  Maximino continues to require the following intervention to  meet STG and LTG's:  PT intervention is no longer required to meet STG/LTG.    Recommendations:  This patient is ready to be discharged from therapy and continue their home treatment program.    Please refer to the daily flowsheet for treatment today, total treatment time and time spent performing 1:1 timed codes.

## 2022-07-15 ENCOUNTER — LAB (OUTPATIENT)
Dept: LAB | Facility: CLINIC | Age: 62
End: 2022-07-15
Payer: COMMERCIAL

## 2022-07-15 DIAGNOSIS — E78.5 HYPERLIPIDEMIA LDL GOAL <130: ICD-10-CM

## 2022-07-15 LAB
ALT SERPL W P-5'-P-CCNC: 57 U/L (ref 0–70)
CHOLEST SERPL-MCNC: 181 MG/DL
CK SERPL-CCNC: 91 U/L (ref 30–300)
FASTING STATUS PATIENT QL REPORTED: YES
HDLC SERPL-MCNC: 36 MG/DL
LDLC SERPL CALC-MCNC: 79 MG/DL
LDLC SERPL CALC-MCNC: ABNORMAL MG/DL
NONHDLC SERPL-MCNC: 145 MG/DL
TRIGL SERPL-MCNC: 581 MG/DL

## 2022-07-15 PROCEDURE — 82550 ASSAY OF CK (CPK): CPT

## 2022-07-15 PROCEDURE — 36415 COLL VENOUS BLD VENIPUNCTURE: CPT

## 2022-07-15 PROCEDURE — 84460 ALANINE AMINO (ALT) (SGPT): CPT

## 2022-07-15 PROCEDURE — 80061 LIPID PANEL: CPT

## 2022-07-15 NOTE — LETTER
July 26, 2022      Maximino [place]RA[place]TON [place]  [address] [facility] [address]        Dear  Flor,    We are writing to inform you of your test results.    Changed to rosuvastatin 40 mg from his atorvastatin 80 mg.  Pt needs repeat fasting labs in 2 months with a follow up appt a few days later (also due for BP recheck) Please assist in scheduling.    Please call your insurance to see what type of statin medication is covered.    Resulted Orders   Lipid panel reflex to direct LDL Fasting   Result Value Ref Range    Cholesterol 181 <200 mg/dL    Triglycerides 581 (H) <150 mg/dL    Direct Measure HDL 36 (L) >=40 mg/dL    LDL Cholesterol Calculated        Comment:      Cannot estimate LDL when triglyceride exceeds 400 mg/dL    Non HDL Cholesterol 145 (H) <130 mg/dL    Patient Fasting > 8hrs? Yes     Narrative    Cholesterol  Desirable:  <200 mg/dL    Triglycerides  Normal:  Less than 150 mg/dL  Borderline High:  150-199 mg/dL  High:  200-499 mg/dL  Very High:  Greater than or equal to 500 mg/dL    Direct Measure HDL  Female:  Greater than or equal to 50 mg/dL   Male:  Greater than or equal to 40 mg/dL    LDL Cholesterol  Desirable:  <100mg/dL  Above Desirable:  100-129 mg/dL   Borderline High:  130-159 mg/dL   High:  160-189 mg/dL   Very High:  >= 190 mg/dL    Non HDL Cholesterol  Desirable:  130 mg/dL  Above Desirable:  130-159 mg/dL  Borderline High:  160-189 mg/dL  High:  190-219 mg/dL  Very High:  Greater than or equal to 220 mg/dL   ALT   Result Value Ref Range    ALT 57 0 - 70 U/L   CK total   Result Value Ref Range    CK 91 30 - 300 U/L   LDL cholesterol direct   Result Value Ref Range    LDL Cholesterol Direct 79 <100 mg/dL      Comment:      Age 0-19 years:  Desirable: 0-110 mg/dL   Borderline high: 110-129 mg/dL   High: >= 130 mg/dL    Age 20 years and older:  Desirable: <100mg/dL  Above desirable: 100-129 mg/dL   Borderline high: 130-159 mg/dL   High: 160-189 mg/dL   Very high: >= 190 mg/dL       If  you have any questions or concerns, please call the clinic at the number listed above.       Sincerely,      KAREN Pineda CNP

## 2022-07-18 DIAGNOSIS — E78.2 MIXED HYPERLIPIDEMIA: Primary | ICD-10-CM

## 2022-07-18 NOTE — RESULT ENCOUNTER NOTE
Note to Staff: please call the patient to explain results.    His cholesterol went up since his increase in his statin medication. Is he compliant with his 80 mg daily dose? Was this a 10-12 hour fasting specimen. There should be no reason for it to increase with more medicine. I would encourage increased activity and healthy eating.His triglycerides that high can lead to pancreatitis.     If he reports compliance and a fasting specimen I would want to change his statin to Crestor which is more powerful. Either way recheck of his cholesterol is three months is recommended. If it continues to be high even on a different statin he made need cardiology referral to help us get it lowered.     Route back to me please.     Stacy Gallegos, FNP-BC

## 2022-07-20 DIAGNOSIS — E78.2 MIXED HYPERLIPIDEMIA: Primary | ICD-10-CM

## 2022-07-20 RX ORDER — ROSUVASTATIN CALCIUM 40 MG/1
40 TABLET, COATED ORAL DAILY
Qty: 90 TABLET | Refills: 3 | Status: SHIPPED | OUTPATIENT
Start: 2022-07-20 | End: 2022-09-30

## 2022-07-20 NOTE — RESULT ENCOUNTER NOTE
Changed to rosuvastatin 40 mg from his atorvastatin 80 mg.  Pt needs repeat fasting labs in 2 months with a follow up appt a few days later (also due for BP recheck) Please assist in scheduling.

## 2022-07-21 ENCOUNTER — TELEPHONE (OUTPATIENT)
Dept: FAMILY MEDICINE | Facility: CLINIC | Age: 62
End: 2022-07-21

## 2022-07-21 DIAGNOSIS — E78.5 HYPERLIPIDEMIA LDL GOAL <130: ICD-10-CM

## 2022-07-21 NOTE — TELEPHONE ENCOUNTER
Prior Authorization Retail Medication Request    Medication/Dose: rosuvastatin (CRESTOR) 40 MG tablet  ICD code (if different than what is on RX):     Previously Tried and Failed:     Rationale:   Drug not covered by patient plan.    Insurance Name:  [facility]   Insurance ID:  [id number]  [facility] Care[employer]:  [phone]         Pharmacy Information (if different than what is on RX)  Name:     Phone:

## 2022-07-22 NOTE — TELEPHONE ENCOUNTER
PRIOR AUTHORIZATION DENIED    Medication: rosuvastatin (CRESTOR) 40 MG tablet-PLAN EXCLUSION    Denial Date: 7/22/2022    Denial Rational:         Appeal Information: N/A Plan Exclusion

## 2022-07-22 NOTE — TELEPHONE ENCOUNTER
Central Prior Authorization Team   Phone: [phone]      PA Initiation    Medication: rosuvastatin (CRESTOR) 40 MG tablet  Insurance Company: [employer] - Phone [phone] Fax [phone]  Pharmacy Filling the Rx: [id number] [address]  Filling Pharmacy Phone: [phone]  Filling Pharmacy Fax:    Start Date: 7/22/2022

## 2022-07-25 NOTE — TELEPHONE ENCOUNTER
Attempt # 1    Called #   Telephone Information:   Mobile [phone]       Left a non detailed VM - pt needs to ask his insurance what they will cover for a statin     Echo Gustafson RN, BSN  [place] [facility]

## 2022-07-26 NOTE — TELEPHONE ENCOUNTER
Pt called back, noted the pharmacy noted he would be able to pick this up. Advised if expensive or not covered to call back.    Alberto DELVALLE RN   [facility]

## 2022-07-26 NOTE — TELEPHONE ENCOUNTER
Attempt # 2  Called # [phone]     Left a non detailed VM to call back at [phone] and ask for any available Triage Nurse.    Alberto Juarez RN   [facility]

## 2022-07-29 RX ORDER — ATORVASTATIN CALCIUM 80 MG/1
80 TABLET, FILM COATED ORAL DAILY
Qty: 30 TABLET | Refills: 1 | Status: SHIPPED | OUTPATIENT
Start: 2022-07-29 | End: 2022-09-30 | Stop reason: ALTCHOICE

## 2022-07-29 NOTE — TELEPHONE ENCOUNTER
Pt called back to confirm Rosuvastatin is not covered, Pt is going to reach out to insurance to find out why. Pt inquired in meantime if he can fill a short supply of the Atorvastatin.     Writer advised will confirm with Provider if this is ok. Will send to [facility] on 13/42 if okay'd, pt will try to call back today with reasoning.    Alberto DELVALLE RN   [facility]

## 2022-08-10 ENCOUNTER — TELEPHONE (OUTPATIENT)
Dept: GASTROENTEROLOGY | Facility: CLINIC | Age: 62
End: 2022-08-10

## 2022-08-10 NOTE — TELEPHONE ENCOUNTER
Screening Questions    BlueKIND OF PREP RedLOCATION [review exclusion criteria] GreenSEDATION TYPE      1. Are you active on mychart? Y    2. What insurance is in the chart? BCBS     3.   Ordering/Referring Provider: Stacy Gallegos APRN CNP in [facility]    4. BMI   (If greater than 40 review exclusion criteria [PAC APPT IF [MAC] @ [facility])  28.3  [If yes, BMI OVER 40-EXTENDED PREP]      **(Sedation review/consideration needed)**  Do you have a legal guardian or Medical Power of    and/or are you able to give consent for your medical care?     Y - SELF    5. Have you had a positive covid test in the last 90 days?   Y - 7/4/22    6.  Are you currently on dialysis?   N [ If yes, G-PREP & HOSPITAL setting ONLY]     7.  Do you have chronic kidney disease?  N [ If yes, G-PREP ]    8.   Do you have a diagnosis of diabetes?   N   [ If yes, G-PREP ]    9.  On a regular basis do you go 3-5 days between bowel movements?   N   [ If yes, EXTENDED PREP]    10.  Are you taking any prescription pain medications on a routine schedule?    N -  [ If yes, EXTENDED PREP] [If yes, MAC]      11.   Do you have any chemical dependencies such as alcohol, street drugs, or methadone?    N [If yes, MAC]    12.   Do you have any history of post-traumatic stress syndrome, severe anxiety or history of psychosis?    N  [If yes, MAC]    13.  [FEMALES] Are you currently pregnant?     If yes, how many weeks?       Respiratory/Heart Screening:  [If yes to any of the following HOSPITAL setting only]     14. Do you have Pulmonary Hypertension [Lungs]?   N       15. Do you have UNCONTROLLED asthma?   N     16.  Do you use daily home oxygen?  N      17. Do you have mod to severe Obstructive Sleep Apnea?         (OKAY @ [facility]  [facility]  SH  PH  RI  MG - if pt is not on OXYGEN)  N      18.   Have you had a heart or lung transplant?   N      19.   Have you had a stroke or Transient ischemic attack (TIA - aka  mini stroke ) within 6  months?  (If yes, please review exclusion criteria)  N     20.   In the past 6 months, have you had any heart related issues including cardiomyopathy or heart attack?   N           If yes, did it require cardiac stenting or other implantable device?         21.   Do you have any implantable devices in your body (pacemaker, defib, LVAD)? (If yes, please review exclusion criteria)  N   22.  Do you take the medication Phentermine?  NO        23. Do you take nitroglycerin?   N           If yes, how often?   (if yes, HOSPITAL setting ONLY)    24.  Are you currently taking any blood thinners?    [If yes, INFORM patient to [facility] up w/ ORDERING PROVIDER FOR BRIDGING INSTRUCTIONS]     N    25.   Do you transfer independently?                (If NO, please HOSPITAL setting ONLY)  Y    26.   Preferred LOCAL Pharmacy for Pre Prescription:         [facility] #[id number] - [place], [place] - [address]    Scheduling Details  (Please ask for phone number if not scheduled by patient)      Caller : Maximino Linares  Date of Procedure: 10/17/22  Surgeon: JOE  Location:         Sedation Type: CS l   Conscious Sedation- Needs  for 6 hours after the procedure  MAC/General-Needs  for 24 hours after procedure    N :[Pre-op Required] at [facility]  MG and OR for MAC sedation   (advise patient they will need a pre-op WITH IN 30 DAYS of procedure date)     Type of Procedure Scheduled:   Upper and Lower Endoscopy [EGD and Colonoscopy]    Which Colonoscopy Prep was Sent?:   GOLYTELY -     KHORUTS CF PATIENTS & GROEN'S PATIENTS NEEDS EXTENDED PREP       Informed patient they will need an adult  Y  Cannot take any type of public or medical transportation alone    Pre-Procedure Covid test to be completed at [facility] or Externally: Y - HOME  **INFORMED OF HOME TESTING & LAB OPTION**        Confirmed Nurse will call to complete assessment Y    Additional comments: N

## 2022-09-25 ENCOUNTER — HEALTH MAINTENANCE LETTER (OUTPATIENT)
Age: 62
End: 2022-09-25

## 2022-09-28 DIAGNOSIS — Z51.81 MEDICATION MONITORING ENCOUNTER: ICD-10-CM

## 2022-09-28 DIAGNOSIS — Z12.5 SCREENING FOR PROSTATE CANCER: ICD-10-CM

## 2022-09-28 DIAGNOSIS — I49.9 CARDIAC ARRHYTHMIA, UNSPECIFIED CARDIAC ARRHYTHMIA TYPE: ICD-10-CM

## 2022-09-28 DIAGNOSIS — Z12.11 SCREEN FOR COLON CANCER: ICD-10-CM

## 2022-09-28 DIAGNOSIS — Z00.00 ROUTINE GENERAL MEDICAL EXAMINATION AT A HEALTH CARE FACILITY: Primary | ICD-10-CM

## 2022-09-28 DIAGNOSIS — Z85.828 HISTORY OF SKIN CANCER: ICD-10-CM

## 2022-09-28 DIAGNOSIS — E78.5 HYPERLIPIDEMIA LDL GOAL <130: ICD-10-CM

## 2022-09-29 NOTE — TELEPHONE ENCOUNTER
Routing refill request to provider for review/approval because:  New Rx, PCP to review.     Billy BURGESS RN

## 2022-09-30 RX ORDER — ROSUVASTATIN CALCIUM 40 MG/1
40 TABLET, COATED ORAL DAILY
Qty: 90 TABLET | Refills: 3 | Status: SHIPPED | OUTPATIENT
Start: 2022-09-30 | End: 2023-05-15

## 2022-09-30 RX ORDER — ATORVASTATIN CALCIUM 80 MG/1
TABLET, FILM COATED ORAL
Qty: 30 TABLET | Refills: 1 | OUTPATIENT
Start: 2022-09-30

## 2022-09-30 NOTE — TELEPHONE ENCOUNTER
Please med rec, has both atorvastatin and rosuvastatin on his med list, last cpx 3/2021, due for cpx fasting

## 2022-09-30 NOTE — TELEPHONE ENCOUNTER
"Called and spoke with patient.     Patient was switched to rosuvastatin in July. He called his insurance, they state rosuvastatin is not covered, prefer atorvastatin.     Checked [employer]. Can get coupon at [employer] for rosuvastatin.     Patient has been taking his atorvastatin still. Wants to recheck his lipid panel \"see where he is at\".      Sent in refill for atorvastatin. Scheduled lab appointment.     CHRISTIANO GREGORIO RN on 9/30/2022 at 4:37 PM   [facility]        "

## 2022-10-04 ENCOUNTER — LAB (OUTPATIENT)
Dept: LAB | Facility: CLINIC | Age: 62
End: 2022-10-04
Payer: COMMERCIAL

## 2022-10-04 DIAGNOSIS — E78.5 HYPERLIPIDEMIA LDL GOAL <130: ICD-10-CM

## 2022-10-04 DIAGNOSIS — I49.9 CARDIAC ARRHYTHMIA, UNSPECIFIED CARDIAC ARRHYTHMIA TYPE: ICD-10-CM

## 2022-10-04 DIAGNOSIS — Z00.00 ROUTINE GENERAL MEDICAL EXAMINATION AT A HEALTH CARE FACILITY: ICD-10-CM

## 2022-10-04 DIAGNOSIS — Z12.11 SCREEN FOR COLON CANCER: ICD-10-CM

## 2022-10-04 DIAGNOSIS — Z51.81 MEDICATION MONITORING ENCOUNTER: ICD-10-CM

## 2022-10-04 DIAGNOSIS — Z12.5 SCREENING FOR PROSTATE CANCER: ICD-10-CM

## 2022-10-04 LAB
ALBUMIN SERPL-MCNC: 3.9 G/DL (ref 3.4–5)
ALBUMIN UR-MCNC: NEGATIVE MG/DL
ALP SERPL-CCNC: 79 U/L (ref 40–150)
ALT SERPL W P-5'-P-CCNC: 46 U/L (ref 0–70)
ANION GAP SERPL CALCULATED.3IONS-SCNC: 5 MMOL/L (ref 3–14)
APPEARANCE UR: CLEAR
AST SERPL W P-5'-P-CCNC: 23 U/L (ref 0–45)
BILIRUB SERPL-MCNC: 0.9 MG/DL (ref 0.2–1.3)
BILIRUB UR QL STRIP: NEGATIVE
BUN SERPL-MCNC: 16 MG/DL (ref 7–30)
CALCIUM SERPL-MCNC: 9.1 MG/DL (ref 8.5–10.1)
CHLORIDE BLD-SCNC: 105 MMOL/L (ref 94–109)
CHOLEST SERPL-MCNC: 170 MG/DL
CK SERPL-CCNC: 168 U/L (ref 30–300)
CO2 SERPL-SCNC: 28 MMOL/L (ref 20–32)
COLOR UR AUTO: YELLOW
CREAT SERPL-MCNC: 0.83 MG/DL (ref 0.66–1.25)
ERYTHROCYTE [DISTWIDTH] IN BLOOD BY AUTOMATED COUNT: 12.7 % (ref 10–15)
FASTING STATUS PATIENT QL REPORTED: YES
GFR SERPL CREATININE-BSD FRML MDRD: >90 ML/MIN/1.73M2
GLUCOSE BLD-MCNC: 97 MG/DL (ref 70–99)
GLUCOSE UR STRIP-MCNC: NEGATIVE MG/DL
HCT VFR BLD AUTO: 42.5 % (ref 40–53)
HDLC SERPL-MCNC: 46 MG/DL
HGB BLD-MCNC: 14.7 G/DL (ref 13.3–17.7)
HGB UR QL STRIP: NEGATIVE
KETONES UR STRIP-MCNC: NEGATIVE MG/DL
LDLC SERPL CALC-MCNC: 80 MG/DL
LEUKOCYTE ESTERASE UR QL STRIP: NEGATIVE
MCH RBC QN AUTO: 28.7 PG (ref 26.5–33)
MCHC RBC AUTO-ENTMCNC: 34.6 G/DL (ref 31.5–36.5)
MCV RBC AUTO: 83 FL (ref 78–100)
NITRATE UR QL: NEGATIVE
NONHDLC SERPL-MCNC: 124 MG/DL
PH UR STRIP: 7 [PH] (ref 5–7)
PLATELET # BLD AUTO: 234 10E3/UL (ref 150–450)
POTASSIUM BLD-SCNC: 4.2 MMOL/L (ref 3.4–5.3)
PROT SERPL-MCNC: 7.3 G/DL (ref 6.8–8.8)
PSA SERPL-MCNC: 1.12 UG/L (ref 0–4)
RBC # BLD AUTO: 5.12 10E6/UL (ref 4.4–5.9)
SODIUM SERPL-SCNC: 138 MMOL/L (ref 133–144)
SP GR UR STRIP: 1.02 (ref 1–1.03)
TRIGL SERPL-MCNC: 222 MG/DL
TSH SERPL DL<=0.005 MIU/L-ACNC: 2.95 MU/L (ref 0.4–4)
UROBILINOGEN UR STRIP-ACNC: 0.2 E.U./DL
WBC # BLD AUTO: 6.4 10E3/UL (ref 4–11)

## 2022-10-04 PROCEDURE — 84443 ASSAY THYROID STIM HORMONE: CPT

## 2022-10-04 PROCEDURE — G0103 PSA SCREENING: HCPCS

## 2022-10-04 PROCEDURE — 36415 COLL VENOUS BLD VENIPUNCTURE: CPT

## 2022-10-04 PROCEDURE — 82550 ASSAY OF CK (CPK): CPT

## 2022-10-04 PROCEDURE — 85027 COMPLETE CBC AUTOMATED: CPT

## 2022-10-04 PROCEDURE — 81003 URINALYSIS AUTO W/O SCOPE: CPT

## 2022-10-04 PROCEDURE — 82043 UR ALBUMIN QUANTITATIVE: CPT

## 2022-10-04 PROCEDURE — 80061 LIPID PANEL: CPT

## 2022-10-04 PROCEDURE — 80053 COMPREHEN METABOLIC PANEL: CPT

## 2022-10-04 NOTE — LETTER
[facility]  [address] [place] [address]  [phone]                    October 14, 2022    Maximino Perdue [facility]  [address] [facility] [address]      Dear Maximino,    Here is a summary of your recent test results:    FIT test (screening test for colon cancer) was normal.     We advise:     FIT annually   Colonoscopy 10/17/2022.     Your test results are enclosed.      Please contact me if you have any questions.    In addition, here is a list of due or overdue Health Maintenance reminders.    Health Maintenance Due   Topic Date Due     Hepatitis B Vaccine (1 of 3 - 3-dose series) Never done     Pneumococcal Vaccine (2 - PCV) 12/10/2020     COVID-19 Vaccine (4 - Booster for Moderna series) 02/13/2022     Flu Vaccine (1) 09/01/2022       Please call us at [phone] (or use [employer]) to address the above recommendations.            Thank you very much for trusting [facility].     Healthy regards,          Lucas Capellan M.D.        Results for orders placed or performed in visit on 10/04/22   Comprehensive metabolic panel     Status: Normal   Result Value Ref Range    Sodium 138 133 - 144 mmol/L    Potassium 4.2 3.4 - 5.3 mmol/L    Chloride 105 94 - 109 mmol/L    Carbon Dioxide (CO2) 28 20 - 32 mmol/L    Anion Gap 5 3 - 14 mmol/L    Urea Nitrogen 16 7 - 30 mg/dL    Creatinine 0.83 0.66 - 1.25 mg/dL    Calcium 9.1 8.5 - 10.1 mg/dL    Glucose 97 70 - 99 mg/dL    Alkaline Phosphatase 79 40 - 150 U/L    AST 23 0 - 45 U/L    ALT 46 0 - 70 U/L    Protein Total 7.3 6.8 - 8.8 g/dL    Albumin 3.9 3.4 - 5.0 g/dL    Bilirubin Total 0.9 0.2 - 1.3 mg/dL    GFR Estimate >90 >60 mL/min/1.73m2   Lipid panel reflex to direct LDL Fasting     Status: Abnormal   Result Value Ref Range    Cholesterol 170 <200 mg/dL    Triglycerides 222 (H) <150 mg/dL    Direct Measure HDL 46 >=40 mg/dL    LDL Cholesterol Calculated 80 <=100 mg/dL    Non HDL Cholesterol 124 <130  mg/dL    Patient Fasting > 8hrs? Yes     Narrative    Cholesterol  Desirable:  <200 mg/dL    Triglycerides  Normal:  Less than 150 mg/dL  Borderline High:  150-199 mg/dL  High:  200-499 mg/dL  Very High:  Greater than or equal to 500 mg/dL    Direct Measure HDL  Female:  Greater than or equal to 50 mg/dL   Male:  Greater than or equal to 40 mg/dL    LDL Cholesterol  Desirable:  <100mg/dL  Above Desirable:  100-129 mg/dL   Borderline High:  130-159 mg/dL   High:  160-189 mg/dL   Very High:  >= 190 mg/dL    Non HDL Cholesterol  Desirable:  130 mg/dL  Above Desirable:  130-159 mg/dL  Borderline High:  160-189 mg/dL  High:  190-219 mg/dL  Very High:  Greater than or equal to 220 mg/dL   CBC with platelets     Status: Normal   Result Value Ref Range    WBC Count 6.4 4.0 - 11.0 10e3/uL    RBC Count 5.12 4.40 - 5.90 10e6/uL    Hemoglobin 14.7 13.3 - 17.7 g/dL    Hematocrit 42.5 40.0 - 53.0 %    MCV 83 78 - 100 fL    MCH 28.7 26.5 - 33.0 pg    MCHC 34.6 31.5 - 36.5 g/dL    RDW 12.7 10.0 - 15.0 %    Platelet Count 234 150 - 450 10e3/uL   CK total     Status: Normal   Result Value Ref Range     30 - 300 U/L   UA reflex to Microscopic and Culture     Status: Normal    Specimen: Urine, Midstream   Result Value Ref Range    Color Urine Yellow Colorless, Straw, Light Yellow, Yellow    Appearance Urine Clear Clear    Glucose Urine Negative Negative mg/dL    Bilirubin Urine Negative Negative    Ketones Urine Negative Negative mg/dL    Specific Gravity Urine 1.020 1.003 - 1.035    Blood Urine Negative Negative    pH Urine 7.0 5.0 - 7.0    Protein Albumin Urine Negative Negative mg/dL    Urobilinogen Urine 0.2 0.2, 1.0 E.U./dL    Nitrite Urine Negative Negative    Leukocyte Esterase Urine Negative Negative    Narrative    Microscopic not indicated   Albumin Random Urine Quantitative with Creat Ratio     Status: None   Result Value Ref Range    Creatinine Urine mg/dL 126 mg/dL    Albumin Urine mg/L <5 mg/L    Albumin Urine mg/g  Cr     TSH with free T4 reflex     Status: Normal   Result Value Ref Range    TSH 2.95 0.40 - 4.00 mU/L   Prostate Specific Antigen Screen     Status: Normal   Result Value Ref Range    Prostate Specific Antigen Screen 1.12 0.00 - 4.00 ug/L   Fecal colorectal cancer screen FIT     Status: Normal   Result Value Ref Range    Occult Blood Screen FIT Negative Negative

## 2022-10-05 LAB
CREAT UR-MCNC: 126 MG/DL
MICROALBUMIN UR-MCNC: <5 MG/L
MICROALBUMIN/CREAT UR: NORMAL MG/G{CREAT}

## 2022-10-06 NOTE — RESULT ENCOUNTER NOTE
Maximino  I have reviewed your recent labs. Here are the results:    -Normal red blood cell (hgb) levels, normal white blood cell count and normal platelet levels.  -PSA (prostate specific antigen) test is normal.  This indicates a low likelihood of prostate cancer.  ADVISE: rechecking this in 1 year.  -Triglycerides are elevated (however, markedly improved from 2 months ago) which can increase your heart disease risk.  A diet high in fat and simple carbohydrates, genetics and being overweight can contribute to this.  Your LDL(bad) cholesterol and HDL(good) cholesterol levels are normal.  ADVISE: continuing your current medication, exercising 150 minutes of aerobic exercise per week (30 minutes 5 days per week or 50 minutes 3 days per week are options), weight control, and omega-3 fatty acids (fish oil) 3112-8574 mg daily are helpful to improve this.  In 6 months, you should recheck your fasting cholesterol panel.  -Liver and gallbladder tests are normal (ALT,AST, Alk phos, bilirubin), kidney function is normal (Cr, GFR), sodium is normal, potassium is normal, calcium is normal, glucose is normal.  -TSH (thyroid stimulating hormone) level is normal which indicates normal thyroid function.  -CK (muscle enzyme test) is normal.  -Microalbumin (urine protein) test is normal.  ADVISE: rechecking this annually.  -Urine is normal.    For additional lab test information, labtestsonline.org is an excellent reference.       If you have any questions please do not hesitate to contact our office via phone ([phone]) or MyChart.    Mayi Rivera MBA, MS, PA-C (covering for Dr. Capellan)  [facility]

## 2022-10-09 ENCOUNTER — MYC MEDICAL ADVICE (OUTPATIENT)
Dept: FAMILY MEDICINE | Facility: CLINIC | Age: 62
End: 2022-10-09

## 2022-10-10 ENCOUNTER — TELEPHONE (OUTPATIENT)
Dept: GASTROENTEROLOGY | Facility: CLINIC | Age: 62
End: 2022-10-10

## 2022-10-10 DIAGNOSIS — Z12.11 ENCOUNTER FOR SCREENING COLONOSCOPY: Primary | ICD-10-CM

## 2022-10-10 PROCEDURE — 82274 ASSAY TEST FOR BLOOD FECAL: CPT

## 2022-10-10 RX ORDER — BISACODYL 5 MG/1
TABLET, DELAYED RELEASE ORAL
Qty: 4 TABLET | Refills: 0 | Status: ON HOLD | OUTPATIENT
Start: 2022-10-10 | End: 2022-10-17

## 2022-10-10 NOTE — TELEPHONE ENCOUNTER
Pre assessment questions completed for upcoming colonoscopy/EGD procedure scheduled on 10.17.2022    Discussed at home rapid antigen COVID test 1-2 days prior to procedure.    Reviewed procedural arrival time 0800 and facility location .    Designated  policy reviewed. Instructed to have someone stay 6 hours post procedure.     Anticoagulation/blood thinners? no    Electronic implanted devices? no    Reviewed Golytely prep instructions with patient. No fiber/iron supplements or foods that contain nuts/seeds prior to procedure.     Patient verbalized understanding and had no questions or concerns at this time.    Caroline Hernandez RN

## 2022-10-10 NOTE — TELEPHONE ENCOUNTER
Attempted to contact patient regarding upcoming colonoscopy/EGD procedure on 10/17/22 for pre assessment questions. No answer.     Left message to return call to [phone] #[phone]    Covid test scheduled? No Discuss at home rapid antigen COVID test 1-2 days prior to procedure.    Arrival time: 0800    Facility location:      Sedation type: CS     Indication for procedure: screening, gerd    Anticoagulants: no.     Bowel prep recommendation: Golytely d/t mg citrate recall.     Golytely script sent to [id number] - [place], [place] - [address]. Prep instructions sent via nivio.    Debra Pitt RN

## 2022-10-13 LAB — HEMOCCULT STL QL IA: NEGATIVE

## 2022-10-17 ENCOUNTER — HOSPITAL ENCOUNTER (OUTPATIENT)
Facility: CLINIC | Age: 62
Discharge: HOME | End: 2022-10-17
Attending: INTERNAL MEDICINE | Admitting: INTERNAL MEDICINE
Payer: COMMERCIAL

## 2022-10-17 VITALS
BODY MASS INDEX: 26.6 KG/M2 | RESPIRATION RATE: 16 BRPM | OXYGEN SATURATION: 100 % | DIASTOLIC BLOOD PRESSURE: 74 MMHG | WEIGHT: 190 LBS | HEIGHT: 71 IN | SYSTOLIC BLOOD PRESSURE: 116 MMHG | TEMPERATURE: 97.2 F | HEART RATE: 72 BPM

## 2022-10-17 LAB
COLONOSCOPY: NORMAL
UPPER GI ENDOSCOPY: NORMAL

## 2022-10-17 PROCEDURE — G0500 MOD SEDAT ENDO SERVICE >5YRS: HCPCS | Performed by: INTERNAL MEDICINE

## 2022-10-17 PROCEDURE — 45378 DIAGNOSTIC COLONOSCOPY: CPT | Performed by: INTERNAL MEDICINE

## 2022-10-17 PROCEDURE — 99153 MOD SED SAME PHYS/QHP EA: CPT | Performed by: INTERNAL MEDICINE

## 2022-10-17 PROCEDURE — 43235 EGD DIAGNOSTIC BRUSH WASH: CPT | Performed by: INTERNAL MEDICINE

## 2022-10-17 PROCEDURE — 250N000011 HC RX IP 250 OP 636: Performed by: INTERNAL MEDICINE

## 2022-10-17 PROCEDURE — G0105 COLORECTAL SCRN; HI RISK IND: HCPCS | Performed by: INTERNAL MEDICINE

## 2022-10-17 PROCEDURE — 250N000009 HC RX 250: Performed by: INTERNAL MEDICINE

## 2022-10-17 RX ORDER — PROCHLORPERAZINE MALEATE 10 MG
10 TABLET ORAL EVERY 6 HOURS PRN
Status: CANCELLED | OUTPATIENT
Start: 2022-10-17

## 2022-10-17 RX ORDER — NALOXONE HYDROCHLORIDE 0.4 MG/ML
0.4 INJECTION, SOLUTION INTRAMUSCULAR; INTRAVENOUS; SUBCUTANEOUS
Status: CANCELLED | OUTPATIENT
Start: 2022-10-17

## 2022-10-17 RX ORDER — ONDANSETRON 2 MG/ML
4 INJECTION INTRAMUSCULAR; INTRAVENOUS
Status: DISCONTINUED | OUTPATIENT
Start: 2022-10-17 | End: 2022-10-17 | Stop reason: HOSPADM

## 2022-10-17 RX ORDER — ONDANSETRON 4 MG/1
4 TABLET, ORALLY DISINTEGRATING ORAL EVERY 6 HOURS PRN
Status: CANCELLED | OUTPATIENT
Start: 2022-10-17

## 2022-10-17 RX ORDER — ONDANSETRON 2 MG/ML
4 INJECTION INTRAMUSCULAR; INTRAVENOUS EVERY 6 HOURS PRN
Status: CANCELLED | OUTPATIENT
Start: 2022-10-17

## 2022-10-17 RX ORDER — FENTANYL CITRATE 50 UG/ML
INJECTION, SOLUTION INTRAMUSCULAR; INTRAVENOUS PRN
Status: DISCONTINUED | OUTPATIENT
Start: 2022-10-17 | End: 2022-10-17 | Stop reason: HOSPADM

## 2022-10-17 RX ORDER — NALOXONE HYDROCHLORIDE 0.4 MG/ML
0.2 INJECTION, SOLUTION INTRAMUSCULAR; INTRAVENOUS; SUBCUTANEOUS
Status: CANCELLED | OUTPATIENT
Start: 2022-10-17

## 2022-10-17 RX ORDER — LIDOCAINE 40 MG/G
CREAM TOPICAL
Status: DISCONTINUED | OUTPATIENT
Start: 2022-10-17 | End: 2022-10-17 | Stop reason: HOSPADM

## 2022-10-17 RX ORDER — FLUMAZENIL 0.1 MG/ML
0.2 INJECTION, SOLUTION INTRAVENOUS
Status: CANCELLED | OUTPATIENT
Start: 2022-10-17 | End: 2022-10-17

## 2022-10-17 ASSESSMENT — ACTIVITIES OF DAILY LIVING (ADL): ADLS_ACUITY_SCORE: 35

## 2022-10-17 NOTE — H&P
Maximino IRAIS Perdue [facility]  [id number]  male  62 year old      Reason for procedure/surgery: Screening, reflux    Patient Active Problem List   Diagnosis     Hyperlipidemia LDL goal <130     History of skin cancer     Cardiac arrhythmia, unspecified cardiac arrhythmia type       Past Surgical History:    Past Surgical History:   Procedure Laterality Date     SURGICAL HISTORY OF -  Right     x 2, 2000, 2005, arthroscopy, ACL reconstruction     SURGICAL HISTORY OF -   2017    Deviated septum/soft palpate repair       Past Medical History:   Past Medical History:   Diagnosis Date     Cardiac arrhythmia, unspecified cardiac arrhythmia type     benign, extensive work up     History of skin cancer     SCC, BCC     Hyperlipidemia LDL goal <130      Mild intermittent asthma without complication     resolved       Social History:   Social History     Tobacco Use     Smoking status: Never     Smokeless tobacco: Former     Types: Chew   Substance Use Topics     Alcohol use: Yes     Alcohol/week: 1.0 - 2.0 standard drink     Types: 1 - 2 Standard drinks or equivalent per week     Comment: 1-2 per week       Family History:   Family History   Problem Relation Age of Onset     Colon Cancer Mother 68     Rectal Cancer Mother      Abdominal Aortic Aneurysm Father      Bladder Cancer Father 75     Gallbladder Disease Father      Arrhythmia Sister      Heart Failure Maternal Grandmother      Lung Cancer Maternal Grandfather         smoker     Heart Failure Paternal Grandmother      Alcoholism Paternal Grandfather      Obesity Paternal Grandfather      Abdominal Aortic Aneurysm Paternal Uncle        Allergies:   Allergies   Allergen Reactions     Morphine Nausea and Vomiting       Active Medications:   No current outpatient medications on file.       Systemic Review:   CONSTITUTIONAL: NEGATIVE for fever, chills, change in weight  ENT/MOUTH: NEGATIVE for ear, mouth and throat problems  RESP: NEGATIVE for significant cough or SOB  CV: NEGATIVE for  "chest pain, palpitations or peripheral edema    Physical Examination:   Vital Signs: BP (!) 136/92   Temp 97.2  F (36.2  C)   Resp 11   Ht 1.803 m (5' 11\")   Wt 86.2 kg (190 lb)   BMI 26.50 kg/m    GENERAL: healthy, alert and no distress  NECK: no adenopathy, no asymmetry, masses, or scars  RESP: lungs clear to auscultation - no rales, rhonchi or wheezes  CV: regular rate and rhythm, normal S1 S2, no S3 or S4, no murmur, click or rub, no peripheral edema and peripheral pulses strong  ABDOMEN: soft, nontender, no hepatosplenomegaly, no masses and bowel sounds normal  MS: no gross musculoskeletal defects noted, no edema    ASA Classification: (I)  Normal healthy patient  Airway Exam: Mallampati Score: Class II (Complete visualization of uvula)    Plan: Appropriate to proceed as scheduled.      Oneil Cabral MD  10/17/2022    PCP:  Lucas Capellan    "

## 2022-10-18 NOTE — TELEPHONE ENCOUNTER
Patient had 10/4 labs done.   Result note done by Mayi Rivera PA-C.   Patient viewed result note.     CHRISTIANO GREGORIO RN on 10/18/2022 at 4:52 PM   [facility]

## 2022-11-04 ENCOUNTER — TELEPHONE (OUTPATIENT)
Dept: FAMILY MEDICINE | Facility: CLINIC | Age: 62
End: 2022-11-04

## 2022-11-04 DIAGNOSIS — K21.9 GASTROESOPHAGEAL REFLUX DISEASE WITHOUT ESOPHAGITIS: ICD-10-CM

## 2022-11-04 DIAGNOSIS — K44.9 HIATAL HERNIA: Primary | ICD-10-CM

## 2022-11-04 NOTE — TELEPHONE ENCOUNTER
Patient calls.     He had colonoscopy and endoscopy done on 10/17 with [place]. Patient diagnosed with GERD and hiatal hernia. Looking for prescription for GERD.     Routing to provider to review and advise.     Yanique Manning RN  [facility]        ENDOSCOPY  Impression:       - Normal esophagus.                             - Medium-sized hiatal hernia.                             - Normal examined duodenum.                             - No specimens collected.   Recommendation:                          - Resume previous diet.                             - Continue present medications. OK to use PPI as                             needed for GERD symptoms.                             - Hiatal hernia could predispose to more GERD.                             Recommend typical lifestyle modifications for GERD                             (head of bed elevation, small meals, remain upright                             for 4 hours after eating).     COLONOSCOPY  Impression:       - Diverticulosis in the sigmoid colon and in the                                descending colon.                             - The examination was otherwise normal.                             - The examined portion of the ileum was normal.                             - No specimens collected.   Recommendation:                  - Repeat colonoscopy in 5 years given family                                history of colon cancer.                             - Resume previous diet.                             - Continue present medications.

## 2022-11-04 NOTE — TELEPHONE ENCOUNTER
Prescription for Omeprazole 20 mg sent to patient's pharmacy.  FAROOQ Dunn for Dr. Capellan while out of clinic.

## 2022-11-14 ENCOUNTER — MYC MEDICAL ADVICE (OUTPATIENT)
Dept: FAMILY MEDICINE | Facility: CLINIC | Age: 62
End: 2022-11-14

## 2022-11-17 NOTE — TELEPHONE ENCOUNTER
Please see my chart message below     Please review and advise     Thank you     Echo Gustafson RN, BSN  [place] [facility]

## 2023-01-04 ENCOUNTER — OFFICE VISIT (OUTPATIENT)
Dept: FAMILY MEDICINE | Facility: CLINIC | Age: 63
End: 2023-01-04
Payer: COMMERCIAL

## 2023-01-04 VITALS
DIASTOLIC BLOOD PRESSURE: 80 MMHG | TEMPERATURE: 97.4 F | OXYGEN SATURATION: 99 % | RESPIRATION RATE: 16 BRPM | WEIGHT: 198.7 LBS | HEIGHT: 71 IN | HEART RATE: 99 BPM | SYSTOLIC BLOOD PRESSURE: 122 MMHG | BODY MASS INDEX: 27.82 KG/M2

## 2023-01-04 DIAGNOSIS — R19.5 LOOSE STOOLS: ICD-10-CM

## 2023-01-04 DIAGNOSIS — R10.11 ABDOMINAL PAIN, RIGHT UPPER QUADRANT: ICD-10-CM

## 2023-01-04 DIAGNOSIS — K21.9 GASTROESOPHAGEAL REFLUX DISEASE WITHOUT ESOPHAGITIS: Primary | ICD-10-CM

## 2023-01-04 DIAGNOSIS — R10.32 ABDOMINAL PAIN, LEFT LOWER QUADRANT: ICD-10-CM

## 2023-01-04 DIAGNOSIS — R93.2 ABNORMAL ULTRASOUND OF LIVER: ICD-10-CM

## 2023-01-04 DIAGNOSIS — K44.9 HIATAL HERNIA: ICD-10-CM

## 2023-01-04 DIAGNOSIS — R10.13 EPIGASTRIC PAIN: ICD-10-CM

## 2023-01-04 DIAGNOSIS — K76.0 HEPATIC STEATOSIS: ICD-10-CM

## 2023-01-04 LAB
ERYTHROCYTE [DISTWIDTH] IN BLOOD BY AUTOMATED COUNT: 13 % (ref 10–15)
ERYTHROCYTE [SEDIMENTATION RATE] IN BLOOD BY WESTERGREN METHOD: 7 MM/HR (ref 0–20)
HCT VFR BLD AUTO: 42 % (ref 40–53)
HGB BLD-MCNC: 14.2 G/DL (ref 13.3–17.7)
MCH RBC QN AUTO: 28.3 PG (ref 26.5–33)
MCHC RBC AUTO-ENTMCNC: 33.8 G/DL (ref 31.5–36.5)
MCV RBC AUTO: 84 FL (ref 78–100)
PLATELET # BLD AUTO: 251 10E3/UL (ref 150–450)
RBC # BLD AUTO: 5.02 10E6/UL (ref 4.4–5.9)
WBC # BLD AUTO: 10.6 10E3/UL (ref 4–11)

## 2023-01-04 PROCEDURE — 36415 COLL VENOUS BLD VENIPUNCTURE: CPT | Performed by: NURSE PRACTITIONER

## 2023-01-04 PROCEDURE — 85027 COMPLETE CBC AUTOMATED: CPT | Performed by: NURSE PRACTITIONER

## 2023-01-04 PROCEDURE — 83690 ASSAY OF LIPASE: CPT | Performed by: NURSE PRACTITIONER

## 2023-01-04 PROCEDURE — 86140 C-REACTIVE PROTEIN: CPT | Performed by: NURSE PRACTITIONER

## 2023-01-04 PROCEDURE — 80053 COMPREHEN METABOLIC PANEL: CPT | Performed by: NURSE PRACTITIONER

## 2023-01-04 PROCEDURE — 99214 OFFICE O/P EST MOD 30 MIN: CPT | Performed by: NURSE PRACTITIONER

## 2023-01-04 PROCEDURE — 82150 ASSAY OF AMYLASE: CPT | Performed by: NURSE PRACTITIONER

## 2023-01-04 PROCEDURE — 85652 RBC SED RATE AUTOMATED: CPT | Performed by: NURSE PRACTITIONER

## 2023-01-04 NOTE — PROGRESS NOTES
Assessment & Plan     Gastroesophageal reflux disease without esophagitis  Reassuring exam no higher level of care felt to be needed.   Nexium daily.   Labs including stool cultures.   Complete US abdomen.   Follow up with GI versus colorectal.   Plan of care based on lab and imaging results.   Red flag symptoms discussed and if these occur present to the emergency room or call 911.  Maximino verbalizes understanding of plan of care and is in agreement.   - US Abdomen Complete  - Enteric Bacteria and Virus Panel by PIA Stool  - CBC with platelets  - Helicobacter pylori Antigen Stool  - CRP, inflammation  - ESR: Erythrocyte sedimentation rate  - Calprotectin Feces  - C. difficile Toxin B PCR with reflex to C. difficile Antigen and Toxins A/B EIA  - Ova and Parasite Exam Routine  - esomeprazole (NEXIUM) 20 MG DR capsule  Dispense: 90 capsule; Refill: 1  - Comprehensive metabolic panel (BMP + Alb, Alk Phos, ALT, AST, Total. Bili, TP)  - Lipase  - Amylase  - Enteric Bacteria and Virus Panel by PIA Stool  - CBC with platelets  - Helicobacter pylori Antigen Stool  - CRP, inflammation  - ESR: Erythrocyte sedimentation rate  - Calprotectin Feces  - C. difficile Toxin B PCR with reflex to C. difficile Antigen and Toxins A/B EIA  - Ova and Parasite Exam Routine  - Comprehensive metabolic panel (BMP + Alb, Alk Phos, ALT, AST, Total. Bili, TP)  - Lipase  - Amylase    Abdominal pain, left lower quadrant    - US Abdomen Complete  - Enteric Bacteria and Virus Panel by PIA Stool  - CBC with platelets  - Helicobacter pylori Antigen Stool  - CRP, inflammation  - ESR: Erythrocyte sedimentation rate  - Calprotectin Feces  - C. difficile Toxin B PCR with reflex to C. difficile Antigen and Toxins A/B EIA  - Ova and Parasite Exam Routine  - esomeprazole (NEXIUM) 20 MG DR capsule  Dispense: 90 capsule; Refill: 1  - Comprehensive metabolic panel (BMP + Alb, Alk Phos, ALT, AST, Total. Bili, TP)  - Lipase  - Amylase  - Enteric Bacteria and  Virus Panel by PIA Stool  - CBC with platelets  - Helicobacter pylori Antigen Stool  - CRP, inflammation  - ESR: Erythrocyte sedimentation rate  - Calprotectin Feces  - C. difficile Toxin B PCR with reflex to C. difficile Antigen and Toxins A/B EIA  - Ova and Parasite Exam Routine  - Comprehensive metabolic panel (BMP + Alb, Alk Phos, ALT, AST, Total. Bili, TP)  - Lipase  - Amylase    Abdominal pain, right upper quadrant    - US Abdomen Complete  - Enteric Bacteria and Virus Panel by PIA Stool  - CBC with platelets  - Helicobacter pylori Antigen Stool  - CRP, inflammation  - ESR: Erythrocyte sedimentation rate  - Calprotectin Feces  - C. difficile Toxin B PCR with reflex to C. difficile Antigen and Toxins A/B EIA  - Ova and Parasite Exam Routine  - Comprehensive metabolic panel (BMP + Alb, Alk Phos, ALT, AST, Total. Bili, TP)  - Lipase  - Amylase  - Enteric Bacteria and Virus Panel by PIA Stool  - CBC with platelets  - Helicobacter pylori Antigen Stool  - CRP, inflammation  - ESR: Erythrocyte sedimentation rate  - Calprotectin Feces  - C. difficile Toxin B PCR with reflex to C. difficile Antigen and Toxins A/B EIA  - Ova and Parasite Exam Routine  - Comprehensive metabolic panel (BMP + Alb, Alk Phos, ALT, AST, Total. Bili, TP)  - Lipase  - Amylase  - Adult GI  Referral - Consult Only    Loose stools    - US Abdomen Complete  - Enteric Bacteria and Virus Panel by PIA Stool  - CBC with platelets  - Helicobacter pylori Antigen Stool  - CRP, inflammation  - ESR: Erythrocyte sedimentation rate  - Calprotectin Feces  - C. difficile Toxin B PCR with reflex to C. difficile Antigen and Toxins A/B EIA  - Ova and Parasite Exam Routine  - Comprehensive metabolic panel (BMP + Alb, Alk Phos, ALT, AST, Total. Bili, TP)  - Lipase  - Amylase  - Enteric Bacteria and Virus Panel by PIA Stool  - CBC with platelets  - Helicobacter pylori Antigen Stool  - CRP, inflammation  - ESR: Erythrocyte sedimentation rate  - Calprotectin  Feces  - C. difficile Toxin B PCR with reflex to C. difficile Antigen and Toxins A/B EIA  - Ova and Parasite Exam Routine  - Comprehensive metabolic panel (BMP + Alb, Alk Phos, ALT, AST, Total. Bili, TP)  - Lipase  - Amylase    Epigastric pain    - US Abdomen Complete  - Enteric Bacteria and Virus Panel by PIA Stool  - CBC with platelets  - Helicobacter pylori Antigen Stool  - CRP, inflammation  - ESR: Erythrocyte sedimentation rate  - Calprotectin Feces  - C. difficile Toxin B PCR with reflex to C. difficile Antigen and Toxins A/B EIA  - Ova and Parasite Exam Routine  - esomeprazole (NEXIUM) 20 MG DR capsule  Dispense: 90 capsule; Refill: 1  - Comprehensive metabolic panel (BMP + Alb, Alk Phos, ALT, AST, Total. Bili, TP)  - Lipase  - Amylase  - Enteric Bacteria and Virus Panel by PIA Stool  - CBC with platelets  - Helicobacter pylori Antigen Stool  - CRP, inflammation  - ESR: Erythrocyte sedimentation rate  - Calprotectin Feces  - C. difficile Toxin B PCR with reflex to C. difficile Antigen and Toxins A/B EIA  - Ova and Parasite Exam Routine  - Comprehensive metabolic panel (BMP + Alb, Alk Phos, ALT, AST, Total. Bili, TP)  - Lipase  - Amylase    Hiatal hernia    - US Abdomen Complete  - Enteric Bacteria and Virus Panel by PIA Stool  - CBC with platelets  - Helicobacter pylori Antigen Stool  - CRP, inflammation  - ESR: Erythrocyte sedimentation rate  - Calprotectin Feces  - C. difficile Toxin B PCR with reflex to C. difficile Antigen and Toxins A/B EIA  - Ova and Parasite Exam Routine  - esomeprazole (NEXIUM) 20 MG DR capsule  Dispense: 90 capsule; Refill: 1  - Comprehensive metabolic panel (BMP + Alb, Alk Phos, ALT, AST, Total. Bili, TP)  - Lipase  - Amylase  - Enteric Bacteria and Virus Panel by PIA Stool  - CBC with platelets  - Helicobacter pylori Antigen Stool  - CRP, inflammation  - ESR: Erythrocyte sedimentation rate  - Calprotectin Feces  - C. difficile Toxin B PCR with reflex to C. difficile Antigen and  "Toxins A/B EIA  - Ova and Parasite Exam Routine  - Comprehensive metabolic panel (BMP + Alb, Alk Phos, ALT, AST, Total. Bili, TP)  - Lipase  - Amylase    Cardiac arrhythmia, unspecified cardiac arrhythmia type      Abnormal ultrasound of liver    - Adult GI  Referral - Consult Only    Hepatic steatosis    - Adult GI  Referral - Consult Only      BMI:   Estimated body mass index is 27.71 kg/m  as calculated from the following:    Height as of this encounter: 1.803 m (5' 11\").    Weight as of this encounter: 90.1 kg (198 lb 11.2 oz).     No follow-ups on file.      Stacy Gallegos, APRN CNP  [facility] JOESPH Stanton is a 62 year old, presenting for the following health issues:  RECHECK (GI Issue)      History of Present Illness       Reason for visit:  Upper & lower GI issues    He eats 2-3 servings of fruits and vegetables daily.He consumes 1 sweetened beverage(s) daily.He exercises with enough effort to increase his heart rate 10 to 19 minutes per day.  He exercises with enough effort to increase his heart rate 3 or less days per week.   He is taking medications regularly.       Patient is here for follow up GI issues.Patient states he is having Light brown, Green and black stools. He also states that when he eats it goes right thru him. Patient is having some mucus type stool incontinence since his colonoscopy also states there has been blood in his stool.  Stools black-was on pepto quit taking return to normal.   Stools seem fatty.      Review of Systems   Constitutional, HEENT, cardiovascular, pulmonary, GI, , musculoskeletal, neuro, skin, endocrine and psych systems are negative, except as otherwise noted in the HPI.      Objective    /80   Pulse 99   Temp 97.4  F (36.3  C) (Tympanic)   Resp 16   Ht 1.803 m (5' 11\")   Wt 90.1 kg (198 lb 11.2 oz)   SpO2 99%   BMI 27.71 kg/m    Body mass index is 27.71 kg/m .  Physical Exam   GENERAL: healthy, alert " and no distress  RESP: lungs clear to auscultation - no rales, rhonchi or wheezes  CV: regular rate and rhythm, normal S1 S2, no S3 or S4, no murmur, click or rub, no peripheral edema and peripheral pulses strong  ABDOMEN: soft, nontender, no hepatosplenomegaly, no masses and bowel sounds normal  RECTAL: declines.   MS: no gross musculoskeletal defects noted, no edema  SKIN: no suspicious lesions or rashes  NEURO: Normal strength and tone, mentation intact and speech normal  PSYCH: mentation appears normal, affect normal/bright    Results for orders placed or performed in visit on 01/04/23   CBC with platelets     Status: Normal   Result Value Ref Range    WBC Count 10.6 4.0 - 11.0 10e3/uL    RBC Count 5.02 4.40 - 5.90 10e6/uL    Hemoglobin 14.2 13.3 - 17.7 g/dL    Hematocrit 42.0 40.0 - 53.0 %    MCV 84 78 - 100 fL    MCH 28.3 26.5 - 33.0 pg    MCHC 33.8 31.5 - 36.5 g/dL    RDW 13.0 10.0 - 15.0 %    Platelet Count 251 150 - 450 10e3/uL   Helicobacter pylori Antigen Stool     Status: Normal   Result Value Ref Range    Helicobacter pylori Antigen Stool Negative Negative   CRP, inflammation     Status: Normal   Result Value Ref Range    CRP Inflammation <3.00 <5.00 mg/L   ESR: Erythrocyte sedimentation rate     Status: Normal   Result Value Ref Range    Erythrocyte Sedimentation Rate 7 0 - 20 mm/hr   Calprotectin Feces     Status: Abnormal   Result Value Ref Range    Calprotectin Feces 105.0 (H) 0.0 - 49.9 mg/kg   Comprehensive metabolic panel (BMP + Alb, Alk Phos, ALT, AST, Total. Bili, TP)     Status: Abnormal   Result Value Ref Range    Sodium 140 136 - 145 mmol/L    Potassium 4.0 3.4 - 5.3 mmol/L    Chloride 104 98 - 107 mmol/L    Carbon Dioxide (CO2) 19 (L) 22 - 29 mmol/L    Anion Gap 17 (H) 7 - 15 mmol/L    Urea Nitrogen 14.7 8.0 - 23.0 mg/dL    Creatinine 0.97 0.67 - 1.17 mg/dL    Calcium 9.3 8.8 - 10.2 mg/dL    Glucose 104 (H) 70 - 99 mg/dL    Alkaline Phosphatase 76 40 - 129 U/L    AST 42 10 - 50 U/L    ALT  58 (H) 10 - 50 U/L    Protein Total 7.4 6.4 - 8.3 g/dL    Albumin 4.5 3.5 - 5.2 g/dL    Bilirubin Total 0.4 <=1.2 mg/dL    GFR Estimate 89 >60 mL/min/1.73m2   Lipase     Status: Normal   Result Value Ref Range    Lipase 28 13 - 60 U/L   Amylase     Status: Normal   Result Value Ref Range    Amylase 35 28 - 100 U/L   Enteric Bacteria and Virus Panel by PIA Stool     Status: Normal    Specimen: Per Rectum; Stool   Result Value Ref Range    Campylobacter group Not Detected Not Detected    Salmonella species Not Detected Not Detected    Shigella species Not Detected Not Detected    Vibrio group Not Detected Not Detected    Rotavirus Not Detected Not Detected    Shiga toxin 1 gene Not Detected Not Detected    Shiga toxin 2 gene Not Detected Not Detected    Norovirus I and II Not Detected Not Detected    Yersinia enterocolitica Not Detected Not Detected    Narrative    Testing performed by multiplexed, qualitative PCR using the [employer] Enteric Pathogens Nucleic Acid Test. Results should not be used as the sole basis for diagnosis, treatment or other patient management decisions. Positive results do not rule out co-infection with other organisms that are not detected by this test and may not be the sole or definitive cause of patient illness. Negative results in the setting of clinical illness compatible with gastroenteritis may be due to infection by pathogens that are not detected by this test or non-infectious causes such as ulcerative colitis, irritable bowel syndrome or Crohn's disease. Note: Shiga toxin producing E. coli (STEC) typically harbor one or both genes that encode for Shiga toxins 1 and 2.   C. difficile Toxin B PCR with reflex to C. difficile Antigen and Toxins A/B EIA     Status: Normal    Specimen: Per Rectum; Stool   Result Value Ref Range    C Difficile Toxin B by PCR Negative Negative    Narrative    The [employer] Xpert C. difficile Assay, performed on the [employer]  Instrument Systems,  is a qualitative in vitro diagnostic test for rapid detection of toxin B gene sequences from unformed (liquid or soft) stool specimens collected from patients suspected of having Clostridioides difficile infection (CDI). The test utilizes automated real-time polymerase chain reaction (PCR) to detect toxin gene sequences associated with toxin producing C. difficile. The Xpert C. difficile Assay is intended as an aid in the diagnosis of CDI.   Ova and Parasite Exam Routine     Status: Normal    Specimen: Per Rectum; Stool   Result Value Ref Range    OVA AND PARASITE EXAM Negative Negative    Narrative    Cryptosporidium, Cyclospora and Microsporidia are not readily detected by this method.

## 2023-01-05 LAB
ALBUMIN SERPL BCG-MCNC: 4.5 G/DL (ref 3.5–5.2)
ALP SERPL-CCNC: 76 U/L (ref 40–129)
ALT SERPL W P-5'-P-CCNC: 58 U/L (ref 10–50)
AMYLASE SERPL-CCNC: 35 U/L (ref 28–100)
ANION GAP SERPL CALCULATED.3IONS-SCNC: 17 MMOL/L (ref 7–15)
AST SERPL W P-5'-P-CCNC: 42 U/L (ref 10–50)
BILIRUB SERPL-MCNC: 0.4 MG/DL
BUN SERPL-MCNC: 14.7 MG/DL (ref 8–23)
CALCIUM SERPL-MCNC: 9.3 MG/DL (ref 8.8–10.2)
CHLORIDE SERPL-SCNC: 104 MMOL/L (ref 98–107)
CREAT SERPL-MCNC: 0.97 MG/DL (ref 0.67–1.17)
CRP SERPL-MCNC: <3 MG/L
DEPRECATED HCO3 PLAS-SCNC: 19 MMOL/L (ref 22–29)
GFR SERPL CREATININE-BSD FRML MDRD: 89 ML/MIN/1.73M2
GLUCOSE SERPL-MCNC: 104 MG/DL (ref 70–99)
LIPASE SERPL-CCNC: 28 U/L (ref 13–60)
POTASSIUM SERPL-SCNC: 4 MMOL/L (ref 3.4–5.3)
PROT SERPL-MCNC: 7.4 G/DL (ref 6.4–8.3)
SODIUM SERPL-SCNC: 140 MMOL/L (ref 136–145)

## 2023-01-06 ENCOUNTER — ANCILLARY PROCEDURE (OUTPATIENT)
Dept: ULTRASOUND IMAGING | Facility: CLINIC | Age: 63
End: 2023-01-06
Attending: NURSE PRACTITIONER
Payer: COMMERCIAL

## 2023-01-06 DIAGNOSIS — R10.11 ABDOMINAL PAIN, RIGHT UPPER QUADRANT: ICD-10-CM

## 2023-01-06 DIAGNOSIS — K21.9 GASTROESOPHAGEAL REFLUX DISEASE WITHOUT ESOPHAGITIS: ICD-10-CM

## 2023-01-06 DIAGNOSIS — R10.32 ABDOMINAL PAIN, LEFT LOWER QUADRANT: ICD-10-CM

## 2023-01-06 DIAGNOSIS — R10.13 EPIGASTRIC PAIN: ICD-10-CM

## 2023-01-06 DIAGNOSIS — K44.9 HIATAL HERNIA: ICD-10-CM

## 2023-01-06 DIAGNOSIS — R19.5 LOOSE STOOLS: ICD-10-CM

## 2023-01-06 PROCEDURE — 76700 US EXAM ABDOM COMPLETE: CPT

## 2023-01-06 NOTE — RESULT ENCOUNTER NOTE
Dear Maximino,    Here is a summary of your recent test results:    Labs overall looked good with the exception of one very mildly elevated liver enzyme.     Your ultrasound of you abdomen just came back as well. Pancreas and gallbladder are normal which is great.   Liver shows fatty liver/hepatic steatosis. This is a condition in which fat builds up in the liver. Some of the causes are being overweight, diet high in carbohydrates and processed food, having blood sugar levels too high, and/or high cholesterol.  Advise alcohol only in moderation, eating less carbohydrates daily, losing weight, and controlling high blood sugar and cholesterol can help improve symptoms.     Since there is also some coarse changes also in the liver I want you to have at least a one time consult with the liver specialist to determine what is the best follow up or if anything else is needed to look into now.   They will call you to set up this appointment from this referral.       IMPRESSION:  1.  Hepatic steatosis. Mildly coarsened hepatic echotexture and slightly nodular contour suggesting hepatic parenchymal disease.    Please call us at [phone] (or use [employer]) to address the above recommendations if needed.    Thank you for choosing  [facility].  It was an honor and a privilege to participate in your care.       Healthy regards,    Stacy Gallegos, CAMRYN  [facility] [place]

## 2023-01-09 NOTE — RESULT ENCOUNTER NOTE
Note to Staff: please call the patient he did not read his [employer] message about his labs and imaging.   Dear Maximino,     Here is a summary of your recent test results:     Labs overall looked good with the exception of one very mildly elevated liver enzyme.      Your ultrasound of you abdomen just came back as well. Pancreas and gallbladder are normal which is great.   Liver shows fatty liver/hepatic steatosis. This is a condition in which fat builds up in the liver. Some of the causes are being overweight, diet high in carbohydrates and processed food, having blood sugar levels too high, and/or high cholesterol.  Advise alcohol only in moderation, eating less carbohydrates daily, losing weight, and controlling high blood sugar and cholesterol can help improve symptoms.      Since there is also some coarse changes also in the liver I want you to have at least a one time consult with the liver specialist to determine what is the best follow up or if anything else is needed to look into now.   They will call you to set up this appointment from this referral.         IMPRESSION:  1.  Hepatic steatosis. Mildly coarsened hepatic echotexture and slightly nodular contour suggesting hepatic parenchymal disease.     Please call us at [phone] (or use [employer]) to address the above recommendations if needed.     Thank you for choosing [facility].  It was an honor and a privilege to participate in your care.         Healthy regards,     Stacy Gallegos, CAMRYN   [facility]

## 2023-01-12 NOTE — TELEPHONE ENCOUNTER
DIAGNOSIS: Abdominal pain, right upper quadrant [R10.11]  Abnormal ultrasound of liver [R93.2]  Hepatic steatosis    Appt Date: 02.14.2023   NOTES STATUS DETAILS   OFFICE NOTE from referring provider Internal 01.04.2023 Stacy Gallegos APRN CNP   OFFICE NOTES from other specialists     DISCHARGE SUMMARY from hospital     MEDICATION LIST Internal    LIVER BIOSPY (IF APPLICABLE)      PATHOLOGY REPORTS      IMAGING     ENDOSCOPY (IF AVAILABLE) Internal 10.17.2022   COLONOSCOPY (IF AVAILABLE) Internal 10.17.2022   ULTRASOUND LIVER Internal 01.06.2023 US ABDOMEN COMPLETE   CT OF ABDOMEN     MRI OF LIVER     FIBROSCAN, US ELASTOGRAPHY, FIBROSIS SCAN, MR ELASTOGRAPHY     LABS     HEPATIC PANEL (LIVER PANEL)     BASIC METABOLIC PANEL     COMPLETE METABOLIC PANEL Internal 01.04.2023   COMPLETE BLOOD COUNT (CBC) Internal 01.04.2023   INTERNATIONAL NORMALIZED RATIO (INR)     HEPATITIS C ANTIBODY Internal 03.05.2021   HEPATITIS C VIRAL LOAD/PCR     HEPATITIS C GENOTYPE     HEPATITIS B SURFACE ANTIGEN     HEPATITIS B SURFACE ANTIBODY     HEPATITIS B DNA QUANT LEVEL     HEPATITIS B CORE ANTIBODY

## 2023-01-13 ENCOUNTER — APPOINTMENT (OUTPATIENT)
Dept: LAB | Facility: CLINIC | Age: 63
End: 2023-01-13
Payer: COMMERCIAL

## 2023-01-13 LAB — C DIFF TOX B STL QL: NEGATIVE

## 2023-01-13 PROCEDURE — 87338 HPYLORI STOOL AG IA: CPT | Performed by: NURSE PRACTITIONER

## 2023-01-13 PROCEDURE — 87506 IADNA-DNA/RNA PROBE TQ 6-11: CPT | Performed by: NURSE PRACTITIONER

## 2023-01-13 PROCEDURE — 87177 OVA AND PARASITES SMEARS: CPT | Performed by: NURSE PRACTITIONER

## 2023-01-13 PROCEDURE — 83993 ASSAY FOR CALPROTECTIN FECAL: CPT | Performed by: NURSE PRACTITIONER

## 2023-01-13 PROCEDURE — 87209 SMEAR COMPLEX STAIN: CPT | Performed by: NURSE PRACTITIONER

## 2023-01-16 LAB
CALPROTECTIN STL-MCNT: 105 MG/KG (ref 0–49.9)
H PYLORI AG STL QL IA: NEGATIVE
O+P STL MICRO: NEGATIVE

## 2023-01-19 NOTE — RESULT ENCOUNTER NOTE
Dear Maximino,    Here is a summary of your recent test results:    Stool labs normal accept an inflammation marker-diana protectin feces. Given recent colonoscopy that could be why.   Lets see what hepatobiliary team has to say at your appointment. We could consider redoing this labs in about 6 weeks.     For additional lab test information, [email or web address] is an excellent reference.    In addition, here is a list of due or overdue Health Maintenance reminders:    Pneumococcal Vaccine(2 - PCV) due on 12/10/2020  COVID-19 Vaccine(4 - Booster for Moderna series) due on 02/13/2022  Flu Vaccine(1) due on 09/01/2022    Please call us at [phone] (or use [employer]) to address the above recommendations if needed.    [facility] you [facility]  [facility] [place].  It was an honor and a privilege to participate in your care.       Healthy regards,    Stacy Gallegos, DENISP  [facility]

## 2023-02-08 ENCOUNTER — TELEPHONE (OUTPATIENT)
Dept: FAMILY MEDICINE | Facility: CLINIC | Age: 63
End: 2023-02-08
Payer: COMMERCIAL

## 2023-02-08 DIAGNOSIS — G47.33 OSA (OBSTRUCTIVE SLEEP APNEA): ICD-10-CM

## 2023-02-08 DIAGNOSIS — R06.81 APNEA: Primary | ICD-10-CM

## 2023-02-14 ENCOUNTER — OFFICE VISIT (OUTPATIENT)
Dept: GASTROENTEROLOGY | Facility: CLINIC | Age: 63
End: 2023-02-14
Attending: INTERNAL MEDICINE
Payer: COMMERCIAL

## 2023-02-14 ENCOUNTER — PRE VISIT (OUTPATIENT)
Dept: GASTROENTEROLOGY | Facility: CLINIC | Age: 63
End: 2023-02-14

## 2023-02-14 VITALS
BODY MASS INDEX: 27.82 KG/M2 | WEIGHT: 199.5 LBS | DIASTOLIC BLOOD PRESSURE: 94 MMHG | TEMPERATURE: 97.9 F | HEART RATE: 71 BPM | SYSTOLIC BLOOD PRESSURE: 159 MMHG | OXYGEN SATURATION: 97 %

## 2023-02-14 DIAGNOSIS — R10.11 ABDOMINAL PAIN, RIGHT UPPER QUADRANT: ICD-10-CM

## 2023-02-14 DIAGNOSIS — R93.2 ABNORMAL ULTRASOUND OF LIVER: ICD-10-CM

## 2023-02-14 DIAGNOSIS — K76.0 HEPATIC STEATOSIS: ICD-10-CM

## 2023-02-14 PROCEDURE — 99214 OFFICE O/P EST MOD 30 MIN: CPT | Performed by: INTERNAL MEDICINE

## 2023-02-14 RX ORDER — ASPIRIN 81 MG/1
81 TABLET, CHEWABLE ORAL DAILY
COMMUNITY

## 2023-02-14 ASSESSMENT — PAIN SCALES - GENERAL: PAINLEVEL: NO PAIN (0)

## 2023-02-14 NOTE — NURSING NOTE
Chief Complaint   Patient presents with     New Patient       BP (!) 159/94   Pulse 71   Temp 97.9  F (36.6  C) (Oral)   Wt 90.5 kg (199 lb 8 oz)   SpO2 97%   BMI 27.82 kg/m      Arsenio Lowe on 2/14/2023 at 8:02 AM

## 2023-02-14 NOTE — LETTER
2/14/2023         RE: Maximino Linares  [address] [facility] [place] [address]        Dear Colleague,    Thank you for referring your patient, Maximino Linares, to the [facility] [place]. Please see a copy of my visit note below.    [facility] Hepatology    New Patient Visit    Referring provider:  Lucas Capellan MD    CHIEF COMPLAINT AND REASON FOR THE VISIT:  Abnormal liver function tests.    HISTORY OF PRESENT ILLNESS:  Mr. Linares is a 62-year-old male with hyperlipidemia, on Crestor, sleep apnea, on CPAP, and history of a cardiac arrhythmia on no medications and we saw him for abnormal liver function tests.  From his records, he also has on imaging fatty infiltration of the liver, but does not show any signs of advanced liver disease.  In fact, he does not show any edema or abdominal distention.  He does not have any lethargy or confusion and never had jaundice.  He also has no history of melena or hematemesis.      His main symptom is mostly dyspeptic symptoms and this includes bloating and at times loose stools.  He does not have vomiting.  His loose stools are usually associated with milk and milk products and he can have on a regular basis 2-3 bowel movements.  He also has regurgitation of food and gastroesophageal reflux symptoms.  In fact, he had upper endoscopy here at the , and it showed that he had a moderate-sized hiatal hernia, which he claims his dad also used to have that.  Otherwise, he has not lost any weight.  Denies any fever or chills and is working full-time and has no other medical issues.    Medical hx Surgical hx   Past Medical History:   Diagnosis Date     Cardiac arrhythmia, unspecified cardiac arrhythmia type     benign, extensive work up     History of skin cancer     SCC, BCC     Hyperlipidemia LDL goal <130      Mild intermittent asthma without complication     resolved      Past Surgical History:   Procedure Laterality Date     COLONOSCOPY  N/A 10/17/2022    Procedure: COLONOSCOPY;  Surgeon: Oneil Cabral MD;  Location:  GI     ESOPHAGOSCOPY, GASTROSCOPY, DUODENOSCOPY (EGD), COMBINED N/A 10/17/2022    Procedure: ESOPHAGOGASTRODUODENOSCOPY (EGD);  Surgeon: Oneil Cabral MD;  Location:  GI     SURGICAL HISTORY OF -  Right     x 2, 2000, 2005, arthroscopy, ACL reconstruction     SURGICAL HISTORY OF -   2017    Deviated septum/soft palpate repair          Medications  Current Outpatient Medications   Medication Sig Dispense Refill     aspirin (ASA) 81 MG chewable tablet Take 81 mg by mouth daily       esomeprazole (NEXIUM) 20 MG DR capsule Take 1 capsule (20 mg) by mouth every morning (before breakfast) Take 30-60 minutes before eating. 90 capsule 1     rosuvastatin (CRESTOR) 40 MG tablet Take 1 tablet (40 mg) by mouth daily 90 tablet 3     sildenafil (VIAGRA) 50 MG tablet Take 1 tablet (50 mg) by mouth as needed in the morning (30-60 minutes before intercourse). 90 tablet 6       Allergies  Allergies   Allergen Reactions     Morphine Nausea and Vomiting       Family hx Social hx   Family History   Problem Relation Age of Onset     Colon Cancer Mother 68     Rectal Cancer Mother      Abdominal Aortic Aneurysm Father      Bladder Cancer Father 75     Gallbladder Disease Father      Arrhythmia Sister      Heart Failure Maternal Grandmother      Lung Cancer Maternal Grandfather         smoker     Heart Failure Paternal Grandmother      Alcoholism Paternal Grandfather      Obesity Paternal Grandfather      Abdominal Aortic Aneurysm Paternal Uncle       Social History     Tobacco Use     Smoking status: Never     Smokeless tobacco: Former     Types: Chew   Substance Use Topics     Alcohol use: Yes     Alcohol/week: 1.0 - 2.0 standard drink     Types: 1 - 2 Standard drinks or equivalent per week     Comment: 1-2 per week     Drug use: Not Currently          REVIEW OF SYSTEMS:  Mr. Linares denies any recent infections.  He has some fatigue  and used to have headaches, but they are rare now.  He never had seizures.  No cough, shortness of breath, or dyspnea on exertion.  He has no anemia or easy bruising.  He denies any diabetes mellitus or thyroid issues.  He has no psychiatric diagnosis.  He has had a left leg meniscal surgery.  He otherwise does not have any skin problems.  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  Otherwise, a comprehensive review of systems was noncontributory.      Examination  BP (!) 159/94   Pulse 71   Temp 97.9  F (36.6  C) (Oral)   Wt 90.5 kg (199 lb 8 oz)   SpO2 97%   BMI 27.82 kg/m    Body mass index is 27.82 kg/m .    Gen- well, NAD, A+Ox3, normal color  Eye- EOMI  ENT- MMM, normal oropharynx  Lym- no palpable lymphadenopathy  CVS- S1, S2 normal, no added sounds, RRR  RS- CTA  Abd- Mildly obese.  Extr- pulses good, no DEVORA  MS- hands normal- no clubbing  Neuro- A+Ox3, no asterixis  Skin- no rash or jaundice  Psych- normal mood    Laboratory  Lab Results   Component Value Date     01/04/2023     03/05/2021    POTASSIUM 4.0 01/04/2023    POTASSIUM 4.2 10/04/2022    POTASSIUM 4.3 03/05/2021    CHLORIDE 104 01/04/2023    CHLORIDE 105 10/04/2022    CHLORIDE 110 03/05/2021    CO2 19 01/04/2023    CO2 28 10/04/2022    CO2 27 03/05/2021    BUN 14.7 01/04/2023    BUN 16 10/04/2022    BUN 18 03/05/2021    CR 0.97 01/04/2023    CR 0.99 03/05/2021       Lab Results   Component Value Date    BILITOTAL 0.4 01/04/2023    BILITOTAL 0.4 03/05/2021    ALT 58 01/04/2023    ALT 45 03/05/2021    AST 42 01/04/2023    AST 17 03/05/2021    ALKPHOS 76 01/04/2023    ALKPHOS 81 03/05/2021       Lab Results   Component Value Date    ALBUMIN 4.5 01/04/2023    ALBUMIN 3.9 10/04/2022    ALBUMIN 3.8 03/05/2021    PROTTOTAL 7.4 01/04/2023    PROTTOTAL 7.6 03/05/2021        Lab Results   Component Value Date    WBC 10.6 01/04/2023    WBC 7.9 03/05/2021    HGB 14.2 01/04/2023    HGB 15.9 03/05/2021    MCV 84  01/04/2023    MCV 87 03/05/2021     01/04/2023     03/05/2021       No results found for: INR      Radiology    ASSESSMENT AND PLAN:  Mr. Linares has abnormal liver function tests, which were quasi normal the last time they was done here.  He has fatty liver on ultrasound, and on that same ultrasound, they talked about a slightly nodular contour, although the patient's platelets are normal and he does not show any signs of advanced liver disease.     Risk factor for his nonalcoholic steatohepatitis is his significant hyperlipidemia and he has some abdominal obesity.  We formulated the following plan with him:  He will try to lose abdominal weight and he will do that on his own.  His primary care will work on his hyperlipidemia and that needs to be improved, and it will help.  Above all, we told him to abstain from any alcoholic beverages, which he was doing 4 drinks a week.  Upon doing that plan of care, we will recheck his labs within the coming weeks when he is alcohol-free and see if they are almost back to normal, which my expectation is, as they were not highly elevated to begin with.  Mr. Linares otherwise will follow with his primary care physician regarding his healthcare maintenance issues, and I will see him in 6 months.    I spent 45 minutes on this encounter of 02/14/2023 in chart reviewing, history taking, physical examination and documentation.  I spent some of the time in coordination of care and counseling.    Gurjit Lebron MD  Hepatology  [facility]

## 2023-02-14 NOTE — PROGRESS NOTES
[facility] Hepatology    New Patient Visit    Referring provider:  Lucas Capellan MD    CHIEF COMPLAINT AND REASON FOR THE VISIT:  Abnormal liver function tests.    HISTORY OF PRESENT ILLNESS:  Mr. Linares is a 62-year-old male with hyperlipidemia, on Crestor, sleep apnea, on CPAP, and history of a cardiac arrhythmia on no medications and we saw him for abnormal liver function tests.  From his records, he also has on imaging fatty infiltration of the liver, but does not show any signs of advanced liver disease.  In fact, he does not show any edema or abdominal distention.  He does not have any lethargy or confusion and never had jaundice.  He also has no history of melena or hematemesis.      His main symptom is mostly dyspeptic symptoms and this includes bloating and at times loose stools.  He does not have vomiting.  His loose stools are usually associated with milk and milk products and he can have on a regular basis 2-3 bowel movements.  He also has regurgitation of food and gastroesophageal reflux symptoms.  In fact, he had upper endoscopy here at the , and it showed that he had a moderate-sized hiatal hernia, which he claims his dad also used to have that.  Otherwise, he has not lost any weight.  Denies any fever or chills and is working full-time and has no other medical issues.    Medical hx Surgical hx   Past Medical History:   Diagnosis Date     Cardiac arrhythmia, unspecified cardiac arrhythmia type     benign, extensive work up     History of skin cancer     SCC, BCC     Hyperlipidemia LDL goal <130      Mild intermittent asthma without complication     resolved      Past Surgical History:   Procedure Laterality Date     COLONOSCOPY N/A 10/17/2022    Procedure: COLONOSCOPY;  Surgeon: Oneil Cabral MD;  Location: [facility]     ESOPHAGOSCOPY, GASTROSCOPY, DUODENOSCOPY (EGD), COMBINED N/A 10/17/2022    Procedure: ESOPHAGOGASTRODUODENOSCOPY (EGD);  Surgeon: Oneil Cabral MD;  Location:  GI      SURGICAL HISTORY OF -  Right     x 2, 2000, 2005, arthroscopy, ACL reconstruction     SURGICAL HISTORY OF -   2017    Deviated septum/soft palpate repair          Medications  Current Outpatient Medications   Medication Sig Dispense Refill     aspirin (ASA) 81 MG chewable tablet Take 81 mg by mouth daily       esomeprazole (NEXIUM) 20 MG DR capsule Take 1 capsule (20 mg) by mouth every morning (before breakfast) Take 30-60 minutes before eating. 90 capsule 1     rosuvastatin (CRESTOR) 40 MG tablet Take 1 tablet (40 mg) by mouth daily 90 tablet 3     sildenafil (VIAGRA) 50 MG tablet Take 1 tablet (50 mg) by mouth as needed in the morning (30-60 minutes before intercourse). 90 tablet 6       Allergies  Allergies   Allergen Reactions     Morphine Nausea and Vomiting       Family hx Social hx   Family History   Problem Relation Age of Onset     Colon Cancer Mother 68     Rectal Cancer Mother      Abdominal Aortic Aneurysm Father      Bladder Cancer Father 75     Gallbladder Disease Father      Arrhythmia Sister      Heart Failure Maternal Grandmother      Lung Cancer Maternal Grandfather         smoker     Heart Failure Paternal Grandmother      Alcoholism Paternal Grandfather      Obesity Paternal Grandfather      Abdominal Aortic Aneurysm Paternal Uncle       Social History     Tobacco Use     Smoking status: Never     Smokeless tobacco: Former     Types: Chew   Substance Use Topics     Alcohol use: Yes     Alcohol/week: 1.0 - 2.0 standard drink     Types: 1 - 2 Standard drinks or equivalent per week     Comment: 1-2 per week     Drug use: Not Currently          REVIEW OF SYSTEMS:  Mr. Linares denies any recent infections.  He has some fatigue and used to have headaches, but they are rare now.  He never had seizures.  No cough, shortness of breath, or dyspnea on exertion.  He has no anemia or easy bruising.  He denies any diabetes mellitus or thyroid issues.  He has no psychiatric diagnosis.  He has had a left  leg meniscal surgery.  He otherwise does not have any skin problems.  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  Otherwise, a comprehensive review of systems was noncontributory.      Examination  BP (!) 159/94   Pulse 71   Temp 97.9  F (36.6  C) (Oral)   Wt 90.5 kg (199 lb 8 oz)   SpO2 97%   BMI 27.82 kg/m    Body mass index is 27.82 kg/m .    Gen- well, NAD, A+Ox3, normal color  Eye- EOMI  ENT- MMM, normal oropharynx  Lym- no palpable lymphadenopathy  CVS- S1, S2 normal, no added sounds, RRR  RS- CTA  Abd- Mildly obese.  Extr- pulses good, no DEVORA  MS- hands normal- no clubbing  Neuro- A+Ox3, no asterixis  Skin- no rash or jaundice  Psych- normal mood    Laboratory  Lab Results   Component Value Date     01/04/2023     03/05/2021    POTASSIUM 4.0 01/04/2023    POTASSIUM 4.2 10/04/2022    POTASSIUM 4.3 03/05/2021    CHLORIDE 104 01/04/2023    CHLORIDE 105 10/04/2022    CHLORIDE 110 03/05/2021    CO2 19 01/04/2023    CO2 28 10/04/2022    CO2 27 03/05/2021    BUN 14.7 01/04/2023    BUN 16 10/04/2022    BUN 18 03/05/2021    CR 0.97 01/04/2023    CR 0.99 03/05/2021       Lab Results   Component Value Date    BILITOTAL 0.4 01/04/2023    BILITOTAL 0.4 03/05/2021    ALT 58 01/04/2023    ALT 45 03/05/2021    AST 42 01/04/2023    AST 17 03/05/2021    ALKPHOS 76 01/04/2023    ALKPHOS 81 03/05/2021       Lab Results   Component Value Date    ALBUMIN 4.5 01/04/2023    ALBUMIN 3.9 10/04/2022    ALBUMIN 3.8 03/05/2021    PROTTOTAL 7.4 01/04/2023    PROTTOTAL 7.6 03/05/2021        Lab Results   Component Value Date    WBC 10.6 01/04/2023    WBC 7.9 03/05/2021    HGB 14.2 01/04/2023    HGB 15.9 03/05/2021    MCV 84 01/04/2023    MCV 87 03/05/2021     01/04/2023     03/05/2021       No results found for: INR      Radiology    ASSESSMENT AND PLAN:  Mr. Linares has abnormal liver function tests, which were quasi normal the last time they was done here.  He has fatty liver  on ultrasound, and on that same ultrasound, they talked about a slightly nodular contour, although the patient's platelets are normal and he does not show any signs of advanced liver disease.     Risk factor for his nonalcoholic steatohepatitis is his significant hyperlipidemia and he has some abdominal obesity.  We formulated the following plan with him:  He will try to lose abdominal weight and he will do that on his own.  His primary care will work on his hyperlipidemia and that needs to be improved, and it will help.  Above all, we told him to abstain from any alcoholic beverages, which he was doing 4 drinks a week.  Upon doing that plan of care, we will recheck his labs within the coming weeks when he is alcohol-free and see if they are almost back to normal, which my expectation is, as they were not highly elevated to begin with.  Mr. Linares otherwise will follow with his primary care physician regarding his healthcare maintenance issues, and I will see him in 6 months.    I spent 45 minutes on this encounter of 02/14/2023 in chart reviewing, history taking, physical examination and documentation.  I spent some of the time in coordination of care and counseling.    Gurjit Lebron MD  Hepatology  [facility]

## 2023-02-16 PROBLEM — K75.81 NONALCOHOLIC STEATOHEPATITIS (NASH): Status: ACTIVE | Noted: 2023-01-01

## 2023-02-24 ENCOUNTER — LAB (OUTPATIENT)
Dept: LAB | Facility: CLINIC | Age: 63
End: 2023-02-24
Payer: COMMERCIAL

## 2023-02-24 DIAGNOSIS — K76.0 HEPATIC STEATOSIS: ICD-10-CM

## 2023-02-24 DIAGNOSIS — E78.2 MIXED HYPERLIPIDEMIA: ICD-10-CM

## 2023-02-24 LAB
ALBUMIN SERPL BCG-MCNC: 4.5 G/DL (ref 3.5–5.2)
ALP SERPL-CCNC: 70 U/L (ref 40–129)
ALT SERPL W P-5'-P-CCNC: 47 U/L (ref 10–50)
AST SERPL W P-5'-P-CCNC: 34 U/L (ref 10–50)
BILIRUB DIRECT SERPL-MCNC: <0.2 MG/DL (ref 0–0.3)
BILIRUB SERPL-MCNC: 0.6 MG/DL
CHOLEST SERPL-MCNC: 130 MG/DL
CK SERPL-CCNC: 244 U/L (ref 39–308)
HBV SURFACE AB SERPL IA-ACNC: 0.38 M[IU]/ML
HBV SURFACE AB SERPL IA-ACNC: NONREACTIVE M[IU]/ML
HDLC SERPL-MCNC: 40 MG/DL
LDLC SERPL CALC-MCNC: 64 MG/DL
NONHDLC SERPL-MCNC: 90 MG/DL
PROT SERPL-MCNC: 7.3 G/DL (ref 6.4–8.3)
TRIGL SERPL-MCNC: 131 MG/DL

## 2023-02-24 PROCEDURE — 80061 LIPID PANEL: CPT

## 2023-02-24 PROCEDURE — 80076 HEPATIC FUNCTION PANEL: CPT

## 2023-02-24 PROCEDURE — 36415 COLL VENOUS BLD VENIPUNCTURE: CPT

## 2023-02-24 PROCEDURE — 82550 ASSAY OF CK (CPK): CPT

## 2023-02-24 PROCEDURE — 86706 HEP B SURFACE ANTIBODY: CPT

## 2023-05-14 ASSESSMENT — ENCOUNTER SYMPTOMS
CHILLS: 0
EYE PAIN: 0
HEMATOCHEZIA: 0
FEVER: 0
SORE THROAT: 0
SHORTNESS OF BREATH: 0
JOINT SWELLING: 0
FREQUENCY: 0
ARTHRALGIAS: 0
PARESTHESIAS: 0
MYALGIAS: 0
DIARRHEA: 0
ABDOMINAL PAIN: 0
NERVOUS/ANXIOUS: 0
HEMATURIA: 0
HEADACHES: 0
CONSTIPATION: 0
NAUSEA: 0
DYSURIA: 0
COUGH: 0
PALPITATIONS: 0
DIZZINESS: 0
HEARTBURN: 0
WEAKNESS: 0

## 2023-05-15 ENCOUNTER — OFFICE VISIT (OUTPATIENT)
Dept: FAMILY MEDICINE | Facility: CLINIC | Age: 63
End: 2023-05-15
Payer: COMMERCIAL

## 2023-05-15 ENCOUNTER — TRANSFERRED RECORDS (OUTPATIENT)
Dept: HEALTH INFORMATION MANAGEMENT | Facility: CLINIC | Age: 63
End: 2023-05-15

## 2023-05-15 VITALS
DIASTOLIC BLOOD PRESSURE: 72 MMHG | HEIGHT: 71 IN | RESPIRATION RATE: 16 BRPM | OXYGEN SATURATION: 96 % | HEART RATE: 81 BPM | TEMPERATURE: 98.4 F | WEIGHT: 194.2 LBS | SYSTOLIC BLOOD PRESSURE: 118 MMHG | BODY MASS INDEX: 27.19 KG/M2

## 2023-05-15 DIAGNOSIS — Z12.5 SCREENING FOR PROSTATE CANCER: ICD-10-CM

## 2023-05-15 DIAGNOSIS — I49.9 CARDIAC ARRHYTHMIA, UNSPECIFIED CARDIAC ARRHYTHMIA TYPE: ICD-10-CM

## 2023-05-15 DIAGNOSIS — Z12.11 SCREEN FOR COLON CANCER: ICD-10-CM

## 2023-05-15 DIAGNOSIS — K21.9 GASTROESOPHAGEAL REFLUX DISEASE WITHOUT ESOPHAGITIS: ICD-10-CM

## 2023-05-15 DIAGNOSIS — N52.9 ERECTILE DYSFUNCTION, UNSPECIFIED ERECTILE DYSFUNCTION TYPE: ICD-10-CM

## 2023-05-15 DIAGNOSIS — Z51.81 MEDICATION MONITORING ENCOUNTER: ICD-10-CM

## 2023-05-15 DIAGNOSIS — E78.5 HYPERLIPIDEMIA LDL GOAL <130: ICD-10-CM

## 2023-05-15 DIAGNOSIS — R15.9 INCONTINENCE OF FECES, UNSPECIFIED FECAL INCONTINENCE TYPE: ICD-10-CM

## 2023-05-15 DIAGNOSIS — Z00.00 ROUTINE GENERAL MEDICAL EXAMINATION AT A HEALTH CARE FACILITY: Primary | ICD-10-CM

## 2023-05-15 DIAGNOSIS — K44.9 HIATAL HERNIA: ICD-10-CM

## 2023-05-15 DIAGNOSIS — Z85.828 HISTORY OF SKIN CANCER: ICD-10-CM

## 2023-05-15 DIAGNOSIS — K75.81 NONALCOHOLIC STEATOHEPATITIS (NASH): ICD-10-CM

## 2023-05-15 LAB
ALBUMIN UR-MCNC: NEGATIVE MG/DL
APPEARANCE UR: CLEAR
BILIRUB UR QL STRIP: NEGATIVE
COLOR UR AUTO: YELLOW
ERYTHROCYTE [DISTWIDTH] IN BLOOD BY AUTOMATED COUNT: 12.6 % (ref 10–15)
GLUCOSE UR STRIP-MCNC: NEGATIVE MG/DL
HCT VFR BLD AUTO: 44.1 % (ref 40–53)
HGB BLD-MCNC: 15 G/DL (ref 13.3–17.7)
HGB UR QL STRIP: NEGATIVE
KETONES UR STRIP-MCNC: NEGATIVE MG/DL
LEUKOCYTE ESTERASE UR QL STRIP: NEGATIVE
MCH RBC QN AUTO: 28 PG (ref 26.5–33)
MCHC RBC AUTO-ENTMCNC: 34 G/DL (ref 31.5–36.5)
MCV RBC AUTO: 82 FL (ref 78–100)
NITRATE UR QL: NEGATIVE
PH UR STRIP: 6.5 [PH] (ref 5–7)
PLATELET # BLD AUTO: 252 10E3/UL (ref 150–450)
RBC # BLD AUTO: 5.36 10E6/UL (ref 4.4–5.9)
SP GR UR STRIP: 1.02 (ref 1–1.03)
UROBILINOGEN UR STRIP-ACNC: 0.2 E.U./DL
WBC # BLD AUTO: 7.9 10E3/UL (ref 4–11)

## 2023-05-15 PROCEDURE — 84443 ASSAY THYROID STIM HORMONE: CPT | Performed by: FAMILY MEDICINE

## 2023-05-15 PROCEDURE — 36415 COLL VENOUS BLD VENIPUNCTURE: CPT | Performed by: FAMILY MEDICINE

## 2023-05-15 PROCEDURE — 81003 URINALYSIS AUTO W/O SCOPE: CPT | Performed by: FAMILY MEDICINE

## 2023-05-15 PROCEDURE — 85027 COMPLETE CBC AUTOMATED: CPT | Performed by: FAMILY MEDICINE

## 2023-05-15 PROCEDURE — 80061 LIPID PANEL: CPT | Performed by: FAMILY MEDICINE

## 2023-05-15 PROCEDURE — 99396 PREV VISIT EST AGE 40-64: CPT | Mod: 25 | Performed by: FAMILY MEDICINE

## 2023-05-15 PROCEDURE — G0103 PSA SCREENING: HCPCS | Performed by: FAMILY MEDICINE

## 2023-05-15 PROCEDURE — 82570 ASSAY OF URINE CREATININE: CPT | Performed by: FAMILY MEDICINE

## 2023-05-15 PROCEDURE — 80053 COMPREHEN METABOLIC PANEL: CPT | Performed by: FAMILY MEDICINE

## 2023-05-15 PROCEDURE — 82550 ASSAY OF CK (CPK): CPT | Performed by: FAMILY MEDICINE

## 2023-05-15 PROCEDURE — 82043 UR ALBUMIN QUANTITATIVE: CPT | Performed by: FAMILY MEDICINE

## 2023-05-15 PROCEDURE — 99214 OFFICE O/P EST MOD 30 MIN: CPT | Mod: 25 | Performed by: FAMILY MEDICINE

## 2023-05-15 RX ORDER — SILDENAFIL 50 MG/1
50 TABLET, FILM COATED ORAL DAILY PRN
Qty: 30 TABLET | Refills: 3 | Status: SHIPPED | OUTPATIENT
Start: 2023-05-15 | End: 2024-05-15

## 2023-05-15 RX ORDER — ROSUVASTATIN CALCIUM 40 MG/1
40 TABLET, COATED ORAL DAILY
Qty: 90 TABLET | Refills: 3 | Status: SHIPPED | OUTPATIENT
Start: 2023-05-15 | End: 2024-05-15

## 2023-05-15 ASSESSMENT — ENCOUNTER SYMPTOMS
COUGH: 0
DIARRHEA: 0
HEMATOCHEZIA: 0
CHILLS: 0
HEADACHES: 0
FEVER: 0
EYE PAIN: 0
SORE THROAT: 0
PARESTHESIAS: 0
ARTHRALGIAS: 0
ABDOMINAL PAIN: 0
WEAKNESS: 0
CONSTIPATION: 0
MYALGIAS: 0
PALPITATIONS: 0
HEMATURIA: 0
JOINT SWELLING: 0
NERVOUS/ANXIOUS: 0
HEARTBURN: 0
DYSURIA: 0
NAUSEA: 0
SHORTNESS OF BREATH: 0
FREQUENCY: 0
DIZZINESS: 0

## 2023-05-15 NOTE — LETTER
[facility]  [address] [place] [address]  [phone]                    May 22, 2023    Maximino Perdue [facility]  [address] [facility] [address]      Dear Maximino,    Here is a summary of your recent test results:      They showed:     Labs are overall quite good     We advise:     Continue current cares.   Balanced low cholesterol diet.   Regular exercise.     For additional lab test information, [email or web address] is an excellent reference.     Your test results are enclosed.      Please contact me if you have any questions.    In addition, here is a list of due or overdue Health Maintenance reminders.    Health Maintenance Due   Topic Date Due    Zoster (Shingles) Vaccine (2 of 2) 05/28/2019    Pneumococcal Vaccine (2 - PCV) 12/10/2020    COVID-19 Vaccine (4 - Moderna series) 02/13/2022    Flu Vaccine (1) 09/01/2022       Please call us at [phone] (or use [employer]) to address the above recommendations.            Thank you very much for trusting [facility].     Healthy regards,          Lucas Capellan M.D.        Results for orders placed or performed in visit on 05/15/23   Comprehensive metabolic panel     Status: Normal   Result Value Ref Range    Sodium 141 136 - 145 mmol/L    Potassium 4.4 3.4 - 5.3 mmol/L    Chloride 105 98 - 107 mmol/L    Carbon Dioxide (CO2) 23 22 - 29 mmol/L    Anion Gap 13 7 - 15 mmol/L    Urea Nitrogen 17.4 8.0 - 23.0 mg/dL    Creatinine 0.90 0.67 - 1.17 mg/dL    Calcium 9.8 8.8 - 10.2 mg/dL    Glucose 78 70 - 99 mg/dL    Alkaline Phosphatase 63 40 - 129 U/L    AST 31 10 - 50 U/L    ALT 46 10 - 50 U/L    Protein Total 7.5 6.4 - 8.3 g/dL    Albumin 4.6 3.5 - 5.2 g/dL    Bilirubin Total 0.6 <=1.2 mg/dL    GFR Estimate >90 >60 mL/min/1.73m2   Lipid panel reflex to direct LDL Fasting     Status: Abnormal   Result Value Ref Range    Cholesterol 154 <200 mg/dL    Triglycerides 160 (H) <150 mg/dL    Direct Measure HDL 45  >=40 mg/dL    LDL Cholesterol Calculated 77 <=100 mg/dL    Non HDL Cholesterol 109 <130 mg/dL    Narrative    Cholesterol  Desirable:  <200 mg/dL    Triglycerides  Normal:  Less than 150 mg/dL  Borderline High:  150-199 mg/dL  High:  200-499 mg/dL  Very High:  Greater than or equal to 500 mg/dL    Direct Measure HDL  Female:  Greater than or equal to 50 mg/dL   Male:  Greater than or equal to 40 mg/dL    LDL Cholesterol  Desirable:  <100mg/dL  Above Desirable:  100-129 mg/dL   Borderline High:  130-159 mg/dL   High:  160-189 mg/dL   Very High:  >= 190 mg/dL    Non HDL Cholesterol  Desirable:  130 mg/dL  Above Desirable:  130-159 mg/dL  Borderline High:  160-189 mg/dL  High:  190-219 mg/dL  Very High:  Greater than or equal to 220 mg/dL   CBC with platelets     Status: Normal   Result Value Ref Range    WBC Count 7.9 4.0 - 11.0 10e3/uL    RBC Count 5.36 4.40 - 5.90 10e6/uL    Hemoglobin 15.0 13.3 - 17.7 g/dL    Hematocrit 44.1 40.0 - 53.0 %    MCV 82 78 - 100 fL    MCH 28.0 26.5 - 33.0 pg    MCHC 34.0 31.5 - 36.5 g/dL    RDW 12.6 10.0 - 15.0 %    Platelet Count 252 150 - 450 10e3/uL   CK total     Status: Normal   Result Value Ref Range     39 - 308 U/L   UA Macroscopic with reflex to Microscopic and Culture     Status: Normal    Specimen: Urine, Midstream   Result Value Ref Range    Color Urine Yellow Colorless, Straw, Light Yellow, Yellow    Appearance Urine Clear Clear    Glucose Urine Negative Negative mg/dL    Bilirubin Urine Negative Negative    Ketones Urine Negative Negative mg/dL    Specific Gravity Urine 1.025 1.003 - 1.035    Blood Urine Negative Negative    pH Urine 6.5 5.0 - 7.0    Protein Albumin Urine Negative Negative mg/dL    Urobilinogen Urine 0.2 0.2, 1.0 E.U./dL    Nitrite Urine Negative Negative    Leukocyte Esterase Urine Negative Negative    Narrative    Microscopic not indicated   Albumin Random Urine Quantitative with Creat Ratio     Status: None   Result Value Ref Range    Creatinine  Urine mg/dL 226.0 mg/dL    Albumin Urine mg/L <12.0 mg/L    Albumin Urine mg/g Cr     TSH with free T4 reflex     Status: Normal   Result Value Ref Range    TSH 2.16 0.30 - 4.20 uIU/mL   Prostate Specific Antigen Screen     Status: Normal   Result Value Ref Range    Prostate Specific Antigen Screen 1.20 0.00 - 4.50 ng/mL    Narrative    This result is obtained using the Roche Elecsys total PSA method on the wil [id number] immunoassay analyzer. Results obtained with different assay methods or kits cannot be used interchangeably.

## 2023-05-15 NOTE — PROGRESS NOTES
[facility]  [place]    SUBJECTIVE    CC: Maximino is an 63 year old who presents for preventative health visit.         5/15/2023     2:08 PM   Additional Questions   Roomed by Lula VICENTE          Patient has been advised of split billing requirements and indicates understanding: Yes     Healthy Habits:     Getting at least 3 servings of Calcium per day:  Yes    Bi-annual eye exam:  Yes    Dental care twice a year:  Yes    Sleep apnea or symptoms of sleep apnea:  Sleep apnea    Diet:  Regular (no restrictions)    Frequency of exercise:  2-3 days/week    Duration of exercise:  30-45 minutes    Taking medications regularly:  Yes    PHQ-2 Total Score: 0    Additional concerns today:  Yes    Today's PHQ-2 Score:       5/14/2023     5:25 PM   PHQ-2 ( 1999 [employer])   Q1: Little interest or pleasure in doing things 0   Q2: Feeling down, depressed or hopeless 0   PHQ-2 Score 0   Q1: Little interest or pleasure in doing things Not at all   Q2: Feeling down, depressed or hopeless Not at all   PHQ-2 Score 0     Social History     Tobacco Use     Smoking status: Never     Smokeless tobacco: Former     Types: Chew     Tobacco comments:     Smoked in high schools for a few years     Quit chew in 2014   Vaping Use     Vaping status: Never Used   Substance Use Topics     Alcohol use: Yes     Alcohol/week: 1.0 - 2.0 standard drink of alcohol     Types: 1 - 2 Standard drinks or equivalent per week     Comment: 1-2 per week             5/14/2023     5:25 PM   Alcohol Use   Prescreen: >3 drinks/day or >7 drinks/week? No          View : No data to display.                Last PSA:   PSA   Date Value Ref Range Status   03/05/2021 0.84 0 - 4 ug/L Final     Comment:     Assay Method:  Chemiluminescence using Siemens Vista analyzer     Prostate Specific Antigen Screen   Date Value Ref Range Status   10/04/2022 1.12 0.00 - 4.00 ug/L Final       Reviewed orders with patient. Reviewed health maintenance and updated orders accordingly -  Yes    Reviewed and updated as needed this visit by clinical staff   Tobacco  Allergies  Meds  Problems  Med Hx  Surg Hx  Fam Hx          Reviewed and updated as needed this visit by Provider                 Review of Systems   Constitutional: Negative for chills and fever.   HENT: Positive for hearing loss. Negative for congestion, ear pain and sore throat.    Eyes: Negative for pain and visual disturbance.   Respiratory: Negative for cough and shortness of breath.    Cardiovascular: Negative for chest pain, palpitations and peripheral edema.   Gastrointestinal: Negative for abdominal pain, constipation, diarrhea, heartburn, hematochezia and nausea.   Genitourinary: Positive for impotence. Negative for dysuria, frequency, genital sores, hematuria, penile discharge and urgency.   Musculoskeletal: Negative for arthralgias, joint swelling and myalgias.   Skin: Negative for rash.   Neurological: Negative for dizziness, weakness, headaches and paresthesias.   Psychiatric/Behavioral: Negative for mood changes. The patient is not nervous/anxious.      EDGARDO Lebron - following every 6 months - 8/2023    Lipids    Recent Labs   Lab Test 02/24/23  0720 10/04/22  1330   CHOL 130 170   HDL 40 46   LDL 64 80   TRIG 131 222*     Heart Rhythm - benign - extensive cardiology work up was benign - PVC's    Hx of skin cancer - dermatology - [facility]    GERD / hiatal hernia - nexium helps - not omeprazole (rhythm issues) - probiotics    Stool incontinence since after colonoscopy - 10/2022    Health Maintenance     Colonoscopy:  Last 10/92791   FIT:  NA              PSA:  pending   DEXA:  NA    Health Maintenance Due   Topic Date Due     ZOSTER IMMUNIZATION (2 of 2) 05/28/2019     Pneumococcal Vaccine: Pediatrics (0 to 5 Years) and At-Risk Patients (6 to 64 Years) (2 - PCV) 12/10/2020     COVID-19 Vaccine (4 - Moderna series) 02/13/2022     INFLUENZA VACCINE (1) 09/01/2022       Current Problem List    Patient Active  Problem List   Diagnosis     Hyperlipidemia LDL goal <130     History of skin cancer     Cardiac arrhythmia, unspecified cardiac arrhythmia type     Nonalcoholic steatohepatitis (REYNOSO)     Hiatal hernia     Gastroesophageal reflux disease without esophagitis       Past Medical History    Past Medical History:   Diagnosis Date     Cardiac arrhythmia, unspecified cardiac arrhythmia type     benign, extensive work up     Gastroesophageal reflux disease without esophagitis      Hiatal hernia      History of skin cancer     SCC, BCC     Hyperlipidemia LDL goal <130      Mild intermittent asthma without complication     resolved     Nonalcoholic steatohepatitis (REYNOSO) 2023    Dr Lebron       Past Surgical History    Past Surgical History:   Procedure Laterality Date     COLONOSCOPY N/A 10/17/2022    Procedure: COLONOSCOPY;  Surgeon: Oneil Cabral MD;  Location:  GI     ESOPHAGOSCOPY, GASTROSCOPY, DUODENOSCOPY (EGD), COMBINED N/A 10/17/2022    Procedure: ESOPHAGOGASTRODUODENOSCOPY (EGD);  Surgeon: Oneil Cabral MD;  Location:  GI     SURGICAL HISTORY OF -  Right     x 2, 2000, 2005, arthroscopy, ACL reconstruction     SURGICAL HISTORY OF -   2017    Deviated septum/soft palpate repair       Current Medications    Current Outpatient Medications   Medication Sig Dispense Refill     aspirin (ASA) 81 MG chewable tablet Take 81 mg by mouth daily       esomeprazole (NEXIUM) 20 MG DR capsule Take 1 capsule (20 mg) by mouth every morning (before breakfast) Take 30-60 minutes before eating. 90 capsule 1     rosuvastatin (CRESTOR) 40 MG tablet Take 1 tablet (40 mg) by mouth daily 90 tablet 3     sildenafil (VIAGRA) 50 MG tablet Take 1 tablet (50 mg) by mouth daily as needed (30-60 minutes before intercourse) 30 tablet 3       Allergies    Allergies   Allergen Reactions     Morphine Nausea and Vomiting       Immunizations    Immunization History   Administered Date(s) Administered     COVID-19 Monovalent 18+  (Moderna) 04/07/2021, 05/05/2021, 12/19/2021     Pneumococcal 23 valent 12/10/2019     TDAP Vaccine (Boostrix) 02/08/2021     Zoster recombinant adjuvanted (SHINGRIX) 04/02/2019       Family History    Family History   Problem Relation Age of Onset     Colon Cancer Mother 68     Rectal Cancer Mother      Abdominal Aortic Aneurysm Father      Bladder Cancer Father 75     Gallbladder Disease Father      Prostate Cancer Father      Arrhythmia Sister      Heart Failure Maternal Grandmother      Lung Cancer Maternal Grandfather         smoker/ship      Heart Failure Paternal Grandmother      Alcoholism Paternal Grandfather      Obesity Paternal Grandfather      Abdominal Aortic Aneurysm Paternal Uncle        Social History    Social History     Socioeconomic History     Marital status:      Spouse name: Rand     Number of children: 3     Years of education: Not on file     Highest education level: Not on file   Occupational History     Not on file   Tobacco Use     Smoking status: Never     Smokeless tobacco: Former     Types: Chew     Tobacco comments:     Smoked in high schools for a few years     Quit chew in 2014   Vaping Use     Vaping status: Never Used   Substance and Sexual Activity     Alcohol use: Yes     Alcohol/week: 1.0 - 2.0 standard drink of alcohol     Types: 1 - 2 Standard drinks or equivalent per week     Comment: 1-2 per week     Drug use: Not Currently     Sexual activity: Yes   Other Topics Concern     Not on file   Social History Narrative     Not on file     Social Determinants of Health     Financial Resource Strain: Not on file   Food Insecurity: Not on file   Transportation Needs: Not on file   Physical Activity: Not on file   Stress: Not on file   Social Connections: Not on file   Intimate Partner Violence: Not on file   Housing Stability: Not on file       ROS    CONSTITUTIONAL: NEGATIVE for fever, chills, change in weight  INTEGUMENTARY/SKIN: NEGATIVE for worrisome rashes,  "moles or lesions  EYES: NEGATIVE for vision changes or irritation  ENT/MOUTH: NEGATIVE for ear, mouth and throat problems  RESP: NEGATIVE for significant cough or SOB  BREAST: NEGATIVE for masses, tenderness or discharge  CV: NEGATIVE for chest pain, palpitations or peripheral edema  GI: NEGATIVE for nausea, abdominal pain, heartburn, or change in bowel habits  : NEGATIVE for frequency, dysuria, or hematuria  MUSCULOSKELETAL: NEGATIVE for significant arthralgias or myalgia  NEURO: NEGATIVE for weakness, dizziness or paresthesias  ENDOCRINE: NEGATIVE for temperature intolerance, skin/hair changes  HEME: NEGATIVE for bleeding problems  PSYCHIATRIC: NEGATIVE for changes in mood or affect    OBJECTIVE    /72   Pulse 81   Temp 98.4  F (36.9  C) (Tympanic)   Resp 16   Ht 1.803 m (5' 11\")   Wt 88.1 kg (194 lb 3.2 oz)   SpO2 96%   BMI 27.09 kg/m      EXAM:    GENERAL: healthy, alert and no distress  EYES: Eyes grossly normal to inspection, PERRL and conjunctivae and sclerae normal  HENT: ear canals and TM's normal, nose and mouth without ulcers or lesions  NECK: no adenopathy, no asymmetry, masses, or scars and thyroid normal to palpation  RESP: lungs clear to auscultation - no rales, rhonchi or wheezes  CV: regular rate and rhythm, normal S1 S2, no S3 or S4, no murmur, click or rub, no peripheral edema and peripheral pulses strong  ABDOMEN: soft, nontender, no hepatosplenomegaly, no masses and bowel sounds normal   (male): pt declines  RECTAL: pt declines  MS: no gross musculoskeletal defects noted, no edema  SKIN: no suspicious lesions or rashes  NEURO: Normal strength and tone, mentation intact and speech normal  PSYCH: mentation appears normal, affect normal/bright  LYMPH: no cervical, supraclavicular, axillary, or inguinal adenopathy    DIAGNOSTICS/PROCEDURES    Pending    ASSESSMENT      ICD-10-CM    1. Routine general medical examination at a health care facility  Z00.00 Comprehensive metabolic panel "     Lipid panel reflex to direct LDL Fasting     CBC with platelets     CK total     UA Macroscopic with reflex to Microscopic and Culture     Albumin Random Urine Quantitative with Creat Ratio     TSH with free T4 reflex     Prostate Specific Antigen Screen     Comprehensive metabolic panel     Lipid panel reflex to direct LDL Fasting     CBC with platelets     CK total     UA Macroscopic with reflex to Microscopic and Culture     Albumin Random Urine Quantitative with Creat Ratio     TSH with free T4 reflex     Prostate Specific Antigen Screen     OFFICE/OUTPT VISIT,EST,LEVL IV     REVIEW OF HEALTH MAINTENANCE PROTOCOL ORDERS     CANCELED: Fecal colorectal cancer screen FIT     CANCELED: Fecal colorectal cancer screen FIT      2. Nonalcoholic steatohepatitis (REYNOSO)  K75.81 Comprehensive metabolic panel     CBC with platelets     UA Macroscopic with reflex to Microscopic and Culture     Albumin Random Urine Quantitative with Creat Ratio     Comprehensive metabolic panel     CBC with platelets     UA Macroscopic with reflex to Microscopic and Culture     Albumin Random Urine Quantitative with Creat Ratio     OFFICE/OUTPT VISIT,EST,LEVL IV     REVIEW OF HEALTH MAINTENANCE PROTOCOL ORDERS      3. Hyperlipidemia LDL goal <130  E78.5 Comprehensive metabolic panel     Lipid panel reflex to direct LDL Fasting     CK total     Comprehensive metabolic panel     Lipid panel reflex to direct LDL Fasting     CK total     rosuvastatin (CRESTOR) 40 MG tablet     OFFICE/OUTPT VISIT,EST,LEVL IV     REVIEW OF HEALTH MAINTENANCE PROTOCOL ORDERS      4. Cardiac arrhythmia, unspecified cardiac arrhythmia type  I49.9 OFFICE/OUTPT VISIT,EST,LEVL IV     REVIEW OF HEALTH MAINTENANCE PROTOCOL ORDERS      5. History of skin cancer  Z85.828 Adult Dermatology Referral     OFFICE/OUTPT VISIT,EST,LEVL IV     REVIEW OF HEALTH MAINTENANCE PROTOCOL ORDERS      6. Gastroesophageal reflux disease without esophagitis  K21.9 OFFICE/OUTPT  VISIT,EST,LEVL IV     REVIEW OF HEALTH MAINTENANCE PROTOCOL ORDERS      7. Hiatal hernia  K44.9 OFFICE/OUTPT VISIT,EST,LEVL IV     REVIEW OF HEALTH MAINTENANCE PROTOCOL ORDERS      8. Incontinence of feces, unspecified fecal incontinence type  R15.9 OFFICE/OUTPT VISIT,EST,LEVL IV     REVIEW OF HEALTH MAINTENANCE PROTOCOL ORDERS      9. Erectile dysfunction, unspecified erectile dysfunction type  N52.9 sildenafil (VIAGRA) 50 MG tablet     OFFICE/OUTPT VISIT,EST,LEVL IV     REVIEW OF HEALTH MAINTENANCE PROTOCOL ORDERS      10. Screening for prostate cancer  Z12.5 Prostate Specific Antigen Screen     Prostate Specific Antigen Screen     OFFICE/OUTPT VISIT,EST,LEVL IV     REVIEW OF HEALTH MAINTENANCE PROTOCOL ORDERS      11. Screen for colon cancer  Z12.11 OFFICE/OUTPT VISIT,EST,LEVL IV     REVIEW OF HEALTH MAINTENANCE PROTOCOL ORDERS     CANCELED: Fecal colorectal cancer screen FIT     CANCELED: Fecal colorectal cancer screen FIT      12. Medication monitoring encounter  Z51.81 Comprehensive metabolic panel     Lipid panel reflex to direct LDL Fasting     CBC with platelets     CK total     UA Macroscopic with reflex to Microscopic and Culture     Albumin Random Urine Quantitative with Creat Ratio     TSH with free T4 reflex     Prostate Specific Antigen Screen     Comprehensive metabolic panel     Lipid panel reflex to direct LDL Fasting     CBC with platelets     CK total     UA Macroscopic with reflex to Microscopic and Culture     Albumin Random Urine Quantitative with Creat Ratio     TSH with free T4 reflex     Prostate Specific Antigen Screen     OFFICE/OUTPT VISIT,EST,LEVL IV     REVIEW OF HEALTH MAINTENANCE PROTOCOL ORDERS     CANCELED: Fecal colorectal cancer screen FIT     CANCELED: Fecal colorectal cancer screen FIT          PLAN    Discussed treatment/modality options, including risk and benefits, he desires:    advised alcohol consumption 1oz per day or less, advised 1 multivitamin per day, advised calcium  "6780-2157 mg/d and Vitamin D 800-1200 IU/d, advised dentist every 6 months, advised diet and exercise, advised opthalmologist every 1-2 years, advised self testicular exam q month, further health care maintenance, further lab(s), immunization(s), medication refill(s) and observation    Discussed controversies surrounding PSA. Specifically reviewed 2017 USPSTF findings recommending discussion of PSA testing for men ages 55-69.  Reviewed findings of the  Randomized Study of Screening for Prostate Cancer which showed a 30% reduction in advanced stage prostate cancer and a 20% reduction in death rate from prostate cancer in this age group. PSA-based screening may prevent up to 2 deaths and up to 3 cases of metastatic disease per 1,000 men screened over 13 years.    We've elected to do PSA this year after discussing these controversies.    All diagnosis above reviewed and noted above, otherwise stable.      See [facility] orders for further details.      1) med refills    2) labs pending    3) immunizations - consider COVID bivalent, prevnar 20, shingrix - check on    4) GI - stool incontinence, REYNOSO    5) probiotic/fiber    Return in about 1 year (around 5/15/2024) for Complete Physical, Medication Recheck Visit, Follow Up Chronic.    Health Maintenance Due   Topic Date Due     ZOSTER IMMUNIZATION (2 of 2) 05/28/2019     Pneumococcal Vaccine: Pediatrics (0 to 5 Years) and At-Risk Patients (6 to 64 Years) (2 - PCV) 12/10/2020     COVID-19 Vaccine (4 - Moderna series) 02/13/2022     INFLUENZA VACCINE (1) 09/01/2022       COUNSELING    Reviewed preventive health counseling, as reflected in patient instructions    BP Readings from Last 1 Encounters:   05/15/23 118/72     Estimated body mass index is 27.09 kg/m  as calculated from the following:    Height as of this encounter: 1.803 m (5' 11\").    Weight as of this encounter: 88.1 kg (194 lb 3.2 oz).           reports that he has never smoked. He has quit using " smokeless tobacco.  His smokeless tobacco use included chew.      Counseling Resources:    ATP IV Guidelines  Pooled Cohorts Equation Calculator  FRAX Risk Assessment  ICSI Preventive Guidelines  Dietary Guidelines for Americans, 2010  USDA's MyPlate  ASA Prophylaxis  Lung CA Screening           Lucas Capellan MD, FAAFP     [facility]  [address] [place] [id number]  [email or web address]   Office: [phone]  Fax: [phone]  Pager: [phone]

## 2023-05-16 LAB
ALBUMIN SERPL BCG-MCNC: 4.6 G/DL (ref 3.5–5.2)
ALP SERPL-CCNC: 63 U/L (ref 40–129)
ALT SERPL W P-5'-P-CCNC: 46 U/L (ref 10–50)
ANION GAP SERPL CALCULATED.3IONS-SCNC: 13 MMOL/L (ref 7–15)
AST SERPL W P-5'-P-CCNC: 31 U/L (ref 10–50)
BILIRUB SERPL-MCNC: 0.6 MG/DL
BUN SERPL-MCNC: 17.4 MG/DL (ref 8–23)
CALCIUM SERPL-MCNC: 9.8 MG/DL (ref 8.8–10.2)
CHLORIDE SERPL-SCNC: 105 MMOL/L (ref 98–107)
CHOLEST SERPL-MCNC: 154 MG/DL
CK SERPL-CCNC: 187 U/L (ref 39–308)
CREAT SERPL-MCNC: 0.9 MG/DL (ref 0.67–1.17)
CREAT UR-MCNC: 226 MG/DL
DEPRECATED HCO3 PLAS-SCNC: 23 MMOL/L (ref 22–29)
GFR SERPL CREATININE-BSD FRML MDRD: >90 ML/MIN/1.73M2
GLUCOSE SERPL-MCNC: 78 MG/DL (ref 70–99)
HDLC SERPL-MCNC: 45 MG/DL
LDLC SERPL CALC-MCNC: 77 MG/DL
MICROALBUMIN UR-MCNC: <12 MG/L
MICROALBUMIN/CREAT UR: NORMAL MG/G{CREAT}
NONHDLC SERPL-MCNC: 109 MG/DL
POTASSIUM SERPL-SCNC: 4.4 MMOL/L (ref 3.4–5.3)
PROT SERPL-MCNC: 7.5 G/DL (ref 6.4–8.3)
PSA SERPL DL<=0.01 NG/ML-MCNC: 1.2 NG/ML (ref 0–4.5)
SODIUM SERPL-SCNC: 141 MMOL/L (ref 136–145)
TRIGL SERPL-MCNC: 160 MG/DL
TSH SERPL DL<=0.005 MIU/L-ACNC: 2.16 UIU/ML (ref 0.3–4.2)

## 2023-05-25 ENCOUNTER — TELEPHONE (OUTPATIENT)
Dept: FAMILY MEDICINE | Facility: CLINIC | Age: 63
End: 2023-05-25
Payer: COMMERCIAL

## 2023-05-25 NOTE — TELEPHONE ENCOUNTER
Attempt # 1    Called # [phone]     Left a detailed VM to call back at [phone] and ask for any available Triage Nurse. Need waist circumference to complete form. Patient can call with the measurement or can add.    Charlie Lowe RN  [facility]

## 2023-05-25 NOTE — CONFIDENTIAL NOTE
[facility] 2023 Health Screenings form dated 5/15/23 routed and placed in Dr Capellan's folder for review and signature    Elizabeth S   [place]

## 2023-05-25 NOTE — TELEPHONE ENCOUNTER
Forms/Letter Request    Type of form/letter: [facility] 2023 Health Screenings    Have you been seen for this request: N/A    Do we have the form/letter: Yes: FD    Who is the form from? [facility] 2023 Health Screenings (if other please explain)    Where did/will the form come from? Patient or family brought in

## 2023-05-30 NOTE — TELEPHONE ENCOUNTER
Attempt # 2    Called #   Telephone Information:   Mobile [phone]       Left a non detailed VM     Echo Gustafson RN, BSN  [facility]

## 2023-06-02 NOTE — TELEPHONE ENCOUNTER
Patient stopped in.     Waist measured.   Form completed.     Patient picked up form, copy.     Original sent to [facility] and filed in [facility].     CHRISTIANO GREGORIO RN on 6/2/2023 at 10:28 AM   [facility]

## 2023-07-13 ENCOUNTER — OFFICE VISIT (OUTPATIENT)
Dept: FAMILY MEDICINE | Facility: CLINIC | Age: 63
End: 2023-07-13
Attending: FAMILY MEDICINE
Payer: COMMERCIAL

## 2023-07-13 DIAGNOSIS — D22.9 MULTIPLE BENIGN NEVI: ICD-10-CM

## 2023-07-13 DIAGNOSIS — L91.8 SKIN TAG: Primary | ICD-10-CM

## 2023-07-13 DIAGNOSIS — D18.01 CHERRY ANGIOMA: ICD-10-CM

## 2023-07-13 DIAGNOSIS — L81.4 LENTIGINES: ICD-10-CM

## 2023-07-13 DIAGNOSIS — Z85.828 HISTORY OF SKIN CANCER: ICD-10-CM

## 2023-07-13 PROCEDURE — 99213 OFFICE O/P EST LOW 20 MIN: CPT | Mod: 25 | Performed by: PHYSICIAN ASSISTANT

## 2023-07-13 PROCEDURE — 11200 RMVL SKIN TAGS UP TO&INC 15: CPT | Performed by: PHYSICIAN ASSISTANT

## 2023-07-13 ASSESSMENT — PAIN SCALES - GENERAL: PAINLEVEL: NO PAIN (0)

## 2023-07-13 NOTE — Clinical Note
7/13/2023         RE: Maximino Plasenciavinay  [address] [facility] [address]        Dear Colleague,    Thank you for referring your patient, Maximino Linares, to the [facility]. Please see a copy of my visit note below.    [facility] Dermatology Note  Encounter Date: Jul 13, 2023  Office Visit      Dermatology Problem List:  1. Hx NMSC  - BCC x2 - L upper forehead, L lateral forehead - removed in [place] ~0670-8114  - SCC - R helix - s/p MMS - in [place] ~2016-17    Social: . , did previously had a lot of sun exposure.   ____________________________________________    Assessment & Plan:  # Benign findings: multiple benign nevi, lentigines, cherry angiomas  - edu on benign etiology  - Signs and Symptoms of non-melanoma skin cancer and ABCDEs of melanoma reviewed with patient. Patient encouraged to perform monthly self skin exams and educated on how to perform them. UV precautions reviewed with patient. Patient was asked about new or changing moles/lesions on body.   - Sunscreen: Apply 20 minutes prior to going outdoors and reapply every two hours, when wet or sweating. We recommend using an SPF 30 or higher, and to use one that is water resistant.     - RTC for changes    # History of nonmelanoma skin cancer, no clincial evidence of recurrence.   - Sunscreen: Apply 20 minutes prior to going outdoors and reapply every two hours, when wet or sweating. We recommend using an SPF 30 or higher, and to use one that is water resistant.     - Continue annual skin exams    # Acrochordon, irritated-bilateral axillae x3, circumferential neck x2  - Cryotherapy performed today, see procedure note below.    Procedures Performed:   - Cryotherapy procedure note, location(s): see above. After verbal consent and discussion of risks and benefits including, but not limited to, dyspigmentation/scar, blister, and pain, 5 lesion(s) was(were) treated with 1-2 mm  freeze border for 1-2 cycles with liquid nitrogen. Post cryotherapy instructions were provided.{[id number]}     Follow-up: 1 year(s) in-person, or earlier for new or changing lesions    Staff:     All risks, benefits and alternatives were discussed with patient.  Patient is in agreement and understands the assessment and plan.  All questions were answered.    Amirah Mendenhall PA-C, [facility]  [facility]: Phone: [phone], Fax: [phone]  [facility]: Phone: [phone],  Fax: [phone]  [facility]: Phone: [phone], Fax: [phone]  ____________________________________________    CC: Skin Check (FBSC)      HPI:  Mr. Maximino Linares is a 63 year old male who presents today as a new patient for FBSE. Last seen by Niru Dailey PA-C on 5/14/21. Hx of NMSC. Has a few skin tags he would like removed.    Patient is otherwise feeling well, without additional concerns.    Labs:  none    Physical Exam:  Vitals: There were no vitals taken for this visit.  SKIN: Full skin, which includes the head/face, both arms, chest, back, abdomen,both legs, genitalia and/or groin buttocks, digits and/or nails, was examined.   - Burks's skin type II, <100 nevi  - well healed scars at sites of previous NMSCs - NERD  - There are dome shaped bright red papules on the trunk.   - Multiple regular brown pigmented macules and papules are identified on the trunk and extremities.   - Scattered brown macules on sun exposed areas.    - There is(are) skin colored pedunculated papules with erythema on the gypsy axilla x3, circumferential neck x2. .    - No other lesions of concern on areas examined.     Medications:  Current Outpatient Medications   Medication     aspirin (ASA) 81 MG chewable tablet     esomeprazole (NEXIUM) 20 MG DR capsule     rosuvastatin (CRESTOR) 40 MG tablet     sildenafil (VIAGRA) 50 MG tablet      No current facility-administered medications for this visit.      Past Medical/Surgical History:   Patient Active Problem List   Diagnosis     Hyperlipidemia LDL goal <130     History of skin cancer     Cardiac arrhythmia, unspecified cardiac arrhythmia type     Nonalcoholic steatohepatitis (REYNOSO)     Hiatal hernia     Gastroesophageal reflux disease without esophagitis     Past Medical History:   Diagnosis Date     Cardiac arrhythmia, unspecified cardiac arrhythmia type     benign, extensive work up     Gastroesophageal reflux disease without esophagitis      Hiatal hernia      History of skin cancer     SCC, BCC     Hyperlipidemia LDL goal <130      Mild intermittent asthma without complication     resolved     Nonalcoholic steatohepatitis (REYNOSO) 2023    Dr Lebron                          Again, thank you for allowing me to participate in the care of your patient.        Sincerely,        Amirah Mendenhall PA-C

## 2023-07-13 NOTE — PROGRESS NOTES
[facility] Dermatology Note  Encounter Date: Jul 13, 2023  Office Visit      Dermatology Problem List:  1. Hx NMSC  - BCC x2 - L upper forehead, L lateral forehead - removed in [place] ~5197-7862  - SCC - R helix - s/p MMS - in [place] ~2016-17    Social: . , did previously had a lot of sun exposure.   ____________________________________________    Assessment & Plan:  # Benign findings: multiple benign nevi, lentigines, cherry angiomas  - edu on benign etiology  - Signs and Symptoms of non-melanoma skin cancer and ABCDEs of melanoma reviewed with patient. Patient encouraged to perform monthly self skin exams and educated on how to perform them. UV precautions reviewed with patient. Patient was asked about new or changing moles/lesions on body.   - Sunscreen: Apply 20 minutes prior to going outdoors and reapply every two hours, when wet or sweating. We recommend using an SPF 30 or higher, and to use one that is water resistant.     - RTC for changes    # History of nonmelanoma skin cancer, no clincial evidence of recurrence.   - Sunscreen: Apply 20 minutes prior to going outdoors and reapply every two hours, when wet or sweating. We recommend using an SPF 30 or higher, and to use one that is water resistant.     - Continue annual skin exams    # Acrochordon, irritated-bilateral axillae x3, circumferential neck x2  - Cryotherapy performed today, see procedure note below.    Procedures Performed:   - Cryotherapy procedure note, location(s): see above. After verbal consent and discussion of risks and benefits including, but not limited to, dyspigmentation/scar, blister, and pain, 5 lesion(s) was(were) treated with 1-2 mm freeze border for 1-2 cycles with liquid nitrogen. Post cryotherapy instructions were provided.    Follow-up: 1 year(s) in-person, or earlier for new or changing lesions    Staff:     All risks, benefits and alternatives were discussed with patient.  Patient is  in agreement and understands the assessment and plan.  All questions were answered.    Amirah Mendenhall PA-C, MPAS  [facility]: Phone: [phone], Fax: [phone]  [facility]: Phone: [phone],  Fax: [phone]  [facility]: Phone: [phone], Fax: [phone]  ____________________________________________    CC: Skin Check (FBSC)      HPI:  Mr. Maximino Linares is a 63 year old male who presents today as a new patient for FBSE. Last seen by Niru Dailey PA-C on 5/14/21. Hx of NMSC. Has a few skin tags he would like removed.    Patient is otherwise feeling well, without additional concerns.    Labs:  none    Physical Exam:  Vitals: There were no vitals taken for this visit.  SKIN: Full skin, which includes the head/face, both arms, chest, back, abdomen,both legs, genitalia and/or groin buttocks, digits and/or nails, was examined.   - Burks's skin type II, <100 nevi  - well healed scars at sites of previous NMSCs - NERD  - There are dome shaped bright red papules on the trunk.   - Multiple regular brown pigmented macules and papules are identified on the trunk and extremities.   - Scattered brown macules on sun exposed areas.    - There is(are) skin colored pedunculated papules with erythema on the gypsy axilla x3, circumferential neck x2. .    - No other lesions of concern on areas examined.     Medications:  Current Outpatient Medications   Medication     aspirin (ASA) 81 MG chewable tablet     esomeprazole (NEXIUM) 20 MG DR capsule     rosuvastatin (CRESTOR) 40 MG tablet     sildenafil (VIAGRA) 50 MG tablet     No current facility-administered medications for this visit.      Past Medical/Surgical History:   Patient Active Problem List   Diagnosis     Hyperlipidemia LDL goal <130     History of skin cancer     Cardiac arrhythmia, unspecified cardiac arrhythmia type     Nonalcoholic  steatohepatitis (REYNOSO)     Hiatal hernia     Gastroesophageal reflux disease without esophagitis     Past Medical History:   Diagnosis Date     Cardiac arrhythmia, unspecified cardiac arrhythmia type     benign, extensive work up     Gastroesophageal reflux disease without esophagitis      Hiatal hernia      History of skin cancer     SCC, BCC     Hyperlipidemia LDL goal <130      Mild intermittent asthma without complication     resolved     Nonalcoholic steatohepatitis (REYNOSO) 2023    Dr Lebron

## 2023-07-13 NOTE — PATIENT INSTRUCTIONS
Patient Education       Proper skin care from [facility]:    -Eliminate harsh soaps as they strip the natural oils from the skin, often resulting in dry itchy skin ( i.e. Dial, Zest, Japanese Spring)  -Use mild soaps such as Cetaphil or Dove Sensitive Skin in the shower. You do not need to use soap on arms, legs, and trunk every time you shower unless visibly soiled.   -Avoid hot or cold showers.  -After showering, lightly dry off and apply moisturizing within 2-3 minutes. This will help trap moisture in the skin.   -Aggressive use of a moisturizer at least 1-2 times a day to the entire body (including -Vanicream, Cetaphil, Aquaphor or Cerave) and moisturize hands after every washing.  -We recommend using moisturizers that come in a tub that needs to be scooped out, not a pump. This has more of an oil base. It will hold moisture in your skin much better than a water base moisturizer. The above recommended are non-pore clogging.      Wear a sunscreen with at least SPF 30 on your face, ears, neck and V of the chest daily. Wear sunscreen on other areas of the body if those areas are exposed to the sun throughout the day. Sunscreens can contain physical and/or chemical blockers. Physical blockers are less likely to clog pores, these include zinc oxide and titanium dioxide. Reapply every two hour and after swimming.     Sunscreen examples: https://www.ewg.org/sunscreen/    UV radiation  UVA radiation remains constant throughout the day and throughout the year. It is a longer wavelength than UVB and therefore penetrates deeper into the skin leading to immediate and delayed tanning, photoaging, and skin cancer. 70-80% of UVA and UVB radiation occurs between the hours of 10am-2pm.  UVB radiation  UVB radiation causes the most harmful effects and is more significant during the summer months. However, snow and ice can reflect UVB radiation leading to skin damage during the winter months as well. UVB radiation is  responsible for tanning, burning, inflammation, delayed erythema (pinkness), pigmentation (brown spots), and skin cancer.     I recommend self monthly full body exams and yearly full body exams with a dermatology provider. If you develop a new or changing lesion please follow up for examination. Most skin cancers are pink and scaly or pink and pearly. However, we do see blue/brown/black skin cancers.  Consider the ABCDEs of melanoma when giving yourself your monthly full body exam ( don't forget the groin, buttocks, feet, toes, etc). A-asymmetry, B-borders, C-color, D-diameter, E-elevation or evolving. If you see any of these changes please follow up in clinic. If you cannot see your back I recommend purchasing a hand held mirror to use with a larger wall mirror.       Checking for Skin Cancer  You can find cancer early by checking your skin each month. There are 3 kinds of skin cancer. They are melanoma, basal cell carcinoma, and squamous cell carcinoma. Doing monthly skin checks is the best way to find new marks or skin changes. Follow the instructions below for checking your skin.   The ABCDEs of checking moles for melanoma   Check your moles or growths for signs of melanoma using ABCDE:   Asymmetry: the sides of the mole or growth don t match  Border: the edges are ragged, notched, or blurred  Color: the color within the mole or growth varies  Diameter: the mole or growth is larger than 6 mm (size of a pencil eraser)  Evolving: the size, shape, or color of the mole or growth is changing (evolving is not shown in the images below)    Checking for other types of skin cancer  Basal cell carcinoma or squamous cell carcinoma have symptoms such as:     A spot or mole that looks different from all other marks on your skin  Changes in how an area feels, such as itching, tenderness, or pain  Changes in the skin's surface, such as oozing, bleeding, or scaliness  A sore that does not heal  New swelling or redness beyond  the border of a mole    Who s at risk?  Anyone can get skin cancer. But you are at greater risk if you have:   Fair skin, light-colored hair, or light-colored eyes  Many moles or abnormal moles on your skin  A history of sunburns from sunlight or tanning beds  A family history of skin cancer  A history of exposure to radiation or chemicals  A weakened immune system  If you have had skin cancer in the past, you are at risk for recurring skin cancer.   How to check your skin  Do your monthly skin checkups in front of a full-length mirror. Check all parts of your body, including your:   Head (ears, face, neck, and scalp)  Torso (front, back, and sides)  Arms (tops, undersides, upper, and lower armpits)  Hands (palms, backs, and fingers, including under the nails)  Buttocks and genitals  Legs (front, back, and sides)  Feet (tops, soles, toes, including under the nails, and between toes)  If you have a lot of moles, take digital photos of them each month. Make sure to take photos both up close and from a distance. These can help you see if any moles change over time.   Most skin changes are not cancer. But if you see any changes in your skin, call your doctor right away. Only he or she can diagnose a problem. If you have skin cancer, seeing your doctor can be the first step toward getting the treatment that could save your life.   [employer] last reviewed this educational content on 4/1/2019 2000-2020 [employer] [address] [place] [address], [place] [address]. All rights reserved. This information is not intended as a substitute for professional medical care. Always follow your healthcare professional's instructions.       When should I call my doctor?  If you are worsening or not improving, please, contact us or seek urgent care as noted below.     Who should I call with questions (adults)?  [facility] (adult and pediatric): [phone]  [facility]  [place] (adult): [phone]  [facility] ([place], [place], [place] and [place]) [phone]  For urgent needs outside of business hours call the [facility] at [phone] and ask for the dermatology resident on call to be paged  If this is a medical emergency and you are unable to reach an ER, Call 911      If you need a prescription refill, please contact your pharmacy. Refills are approved or denied by our Physicians during normal business hours, Monday through Fridays  Per office policy, refills will not be granted if you have not been seen within the past year (or sooner depending on your child's condition)

## 2023-08-05 DIAGNOSIS — K75.81 NONALCOHOLIC STEATOHEPATITIS (NASH): Primary | ICD-10-CM

## 2023-08-05 NOTE — PROGRESS NOTES
[facility] Hepatology    Assessment  63 year old male with metabolic dysfunction associated steatotic liver disease (MASLD; formerly NAFLD), hyperlipidemia, obstructive sleep apnea and history of cardiac arrythmia.     #. Metabolic dysfunction associated steatotic liver disease (MASLD; formerly NAFLD)  #. Hyperlipidemia  #. Overweight  Patient has metabolic dysfunction associated steatotic liver disease.  He will intermittently consume alcohol but no more than 2 drinks per day. AST and ALT are technically above what is normal of ~30s. Imaging demonstrates hepatic steatosis.  His platelet count is normal which argues against portal hypertension.  His INR is normal which suggests intact synthetic function.  Given these findings I do not suspect that he has advanced fibrosis.    Fibroscan done in clinic with no evidence of fibrosis, but S3 steatosis.  We spent the majority of the visit discussing management of his metabolic syndrome which includes weight loss with goal 5% weight loss, blood pressure management and hyperlipidemia management.  He is currently on a statin.    #. Abdominal cramping  #. Loose stools  #. Intermittent fecal incontinence  Patient developed abdominal cramping, loose stools and intermittent fecal incontinence after October 2022.  He has been tested for C. Difficile and other enteric pathogen's in January which were negative.  His CRP at that time was less than 3.  His ESR was 7.  He had a mildly elevated fecal calprotectin at 100 but he had a recent colonoscopy in October 2022 which showed evidence of a normal endoscopic appearance.  His TSH was normal. No unintentional weight loss. May be related to post infectious irritable bowel syndrome, but would benefit from complete workup.     Plan  - Follow up HgA1c + TTg IgA/IgG  - Referral to general GI for abdominal symptoms; food + symptom diary pending appointment + increase dietary fiber to 25 grams per day  - Goal for 5% weight loss over  the course of the year for management of fatty changes  - Limit alcohol to no more than 2 drinks per occasion, only 2-3 days per week  - Continue statin for hyperlipidemia management     Health Maintenance:   - Hepatitis A non-immune: Twinrix today; complete series with PCP   - Hepatitis B non-immune: Twinrix today; complete series with PCP   - Colon cancer screening: due 2027    RTC: PRN    Given the patient does not have any evidence of fibrosis he does not need to continue to see hepatology.  Would recommend repeating liver studies yearly and a FibroScan every 2 to 3 years to assess for steatosis and fibrosis. Hepatology is happy to see the patient back in clinic if anything were to change.     Nayla Ritter MD (Lizzie)  Advanced & Transplant Hepatology  [facility]      HPI:  His biggest concern is that he has been having issues with his bowel habits since his colonoscopy in October 2022.  He reports that prior to his colonoscopy he had no issues with frequency or consistency of his stool.  After that he developed more loose stools and has had intermittent fecal incontinence.  He denies any melena but occasionally he will have blood in his stool.  He also reports abdominal cramping and bloating which is sometimes related to his bowel habits but not always.  He denies any unintentional weight loss.  He is unclear of what particular foods may cause abdominal discomfort but he has identified that fatty foods cause discomfort.  He denies any recent camping or hunting.  He fishes intermittently the last of which was in the spring when he went to [place].    He denies any signs or symptoms of decompensated liver disease such as jaundice, abdominal distention or confusion.  He has no family history of liver disease.  He takes over-the-counter fiber, Tums and a probiotic.    Medical hx Surgical hx   Past Medical History:   Diagnosis Date    Cardiac arrhythmia, unspecified cardiac  arrhythmia type     benign, extensive work up    Gastroesophageal reflux disease without esophagitis     Hiatal hernia     History of skin cancer     SCC, BCC    Hyperlipidemia LDL goal <130     Mild intermittent asthma without complication     resolved    Nonalcoholic steatohepatitis (REYNOSO) 2023    MAYITO (obstructive sleep apnea)       Past Surgical History:   Procedure Laterality Date    COLONOSCOPY N/A 10/17/2022    Procedure: COLONOSCOPY;  Surgeon: Oneil Cabral MD;  Location:  GI    ESOPHAGOSCOPY, GASTROSCOPY, DUODENOSCOPY (EGD), COMBINED N/A 10/17/2022    Procedure: ESOPHAGOGASTRODUODENOSCOPY (EGD);  Surgeon: Oneil Cabral MD;  Location:  GI    SURGICAL HISTORY OF -  Right     x 2, 2000, 2005, arthroscopy, ACL reconstruction    SURGICAL HISTORY OF -   2017    Deviated septum/soft palpate repair   Denies any abdominal surgeries       Medications  Current Outpatient Medications   Medication Sig Dispense Refill    aspirin (ASA) 81 MG chewable tablet Take 81 mg by mouth daily      esomeprazole (NEXIUM) 20 MG DR capsule Take 1 capsule (20 mg) by mouth every morning (before breakfast) Take 30-60 minutes before eating. 90 capsule 1    rosuvastatin (CRESTOR) 40 MG tablet Take 1 tablet (40 mg) by mouth daily 90 tablet 3    sildenafil (VIAGRA) 50 MG tablet Take 1 tablet (50 mg) by mouth daily as needed (30-60 minutes before intercourse) 30 tablet 3       Allergies  Allergies   Allergen Reactions    Morphine Nausea and Vomiting       Family hx Social hx   Family History   Problem Relation Age of Onset    Colon Cancer Mother 68    Rectal Cancer Mother     Abdominal Aortic Aneurysm Father     Bladder Cancer Father 75    Gallbladder Disease Father     Prostate Cancer Father     Arrhythmia Sister     Heart Failure Maternal Grandmother     Lung Cancer Maternal Grandfather         smoker/ship     Heart Failure Paternal Grandmother     Alcoholism Paternal Grandfather     Obesity Paternal Grandfather      Abdominal Aortic Aneurysm Paternal Uncle    No family history of liver disease   - Employment: Construction  - Lives with wife  - 3 kids  - Alcohol: 1-2 drinks a few times per week, often weekends  - Tobacco: Former chewing + smoking, last 2014  - Drugs: Denies     Review of systems  A 10-point review of systems was negative.    Examination  BP (!) 137/99   Pulse 82   Temp 98.1  F (36.7  C) (Oral)   Wt 87.5 kg (193 lb)   SpO2 98%   BMI 26.92 kg/m     Body mass index is 26.92 kg/m .    Gen-NAD  Eye- EOMI  ENT- MMM, normal oropharynx  CVS- RRR, no murmurs  RS- CTA bilaterally  Abd- Overweight, soft, non-tender, non-distended   Extr- 2+ radial pulses bilaterally, no lower extremity edema bilaterally  MS- hands without clubbing  Neuro- A+Ox3  Skin- no jaundice  Psych- normal mood    Laboratory  BMP  Recent Labs   Lab Test 08/07/23  1042 05/15/23  1419 01/04/23  1655 10/04/22  1330    141 140 138   POTASSIUM 4.3 4.4 4.0 4.2   CHLORIDE 105 105 104 105   SWETA 9.6 9.8 9.3 9.1   CO2 26 23 19* 28   BUN 14.0 17.4 14.7 16   CR 0.91 0.90 0.97 0.83   GLC 93 78 104* 97     CBC  Recent Labs   Lab Test 08/07/23  1042 05/15/23  1419 01/04/23  1655 10/04/22  1330   WBC 6.8 7.9 10.6 6.4   RBC 5.38 5.36 5.02 5.12   HGB 15.0 15.0 14.2 14.7   HCT 45.3 44.1 42.0 42.5   MCV 84 82 84 83   MCH 27.9 28.0 28.3 28.7   MCHC 33.1 34.0 33.8 34.6   RDW 12.9 12.6 13.0 12.7    252 251 234     Liver Function Studies -   Recent Labs   Lab Test 08/07/23  1042   PROTTOTAL 7.3   ALBUMIN 4.5   BILITOTAL 0.4   ALKPHOS 73   AST 37   ALT 48      Hepatitis A IgG non-reactive  Hepatitis B s Ag non-reactive  Hepatitis B s Ab 0  Iron saturation 26%    Previous labs reviewed   Hepatitis B s Ab 0.38  Hepatitis C Ab non-reactive  TSH wnl (2023)    Fibroscan 8/9/2023   29% IQR  2.9 kPa 12% IQR     Radiology  Abdominal US 1/2023  1.  Hepatic steatosis. Mildly coarsened hepatic echotexture and slightly nodular contour suggesting hepatic  parenchymal disease.     Imaging reports have been independently reviewed.

## 2023-08-07 ENCOUNTER — LAB (OUTPATIENT)
Dept: LAB | Facility: CLINIC | Age: 63
End: 2023-08-07
Payer: COMMERCIAL

## 2023-08-07 DIAGNOSIS — R19.7 DIARRHEA, UNSPECIFIED TYPE: ICD-10-CM

## 2023-08-07 DIAGNOSIS — K75.81 NONALCOHOLIC STEATOHEPATITIS (NASH): ICD-10-CM

## 2023-08-07 LAB
ALBUMIN SERPL BCG-MCNC: 4.5 G/DL (ref 3.5–5.2)
ALP SERPL-CCNC: 73 U/L (ref 40–129)
ALT SERPL W P-5'-P-CCNC: 48 U/L (ref 0–70)
ANION GAP SERPL CALCULATED.3IONS-SCNC: 9 MMOL/L (ref 7–15)
AST SERPL W P-5'-P-CCNC: 37 U/L (ref 0–45)
BILIRUB DIRECT SERPL-MCNC: <0.2 MG/DL (ref 0–0.3)
BILIRUB SERPL-MCNC: 0.4 MG/DL
BUN SERPL-MCNC: 14 MG/DL (ref 8–23)
CALCIUM SERPL-MCNC: 9.6 MG/DL (ref 8.8–10.2)
CHLORIDE SERPL-SCNC: 105 MMOL/L (ref 98–107)
CREAT SERPL-MCNC: 0.91 MG/DL (ref 0.67–1.17)
DEPRECATED HCO3 PLAS-SCNC: 26 MMOL/L (ref 22–29)
ERYTHROCYTE [DISTWIDTH] IN BLOOD BY AUTOMATED COUNT: 12.9 % (ref 10–15)
GFR SERPL CREATININE-BSD FRML MDRD: >90 ML/MIN/1.73M2
GLUCOSE SERPL-MCNC: 93 MG/DL (ref 70–99)
HCT VFR BLD AUTO: 45.3 % (ref 40–53)
HGB BLD-MCNC: 15 G/DL (ref 13.3–17.7)
INR PPP: 1.02 (ref 0.85–1.15)
IRON BINDING CAPACITY (ROCHE): 292 UG/DL (ref 240–430)
IRON SATN MFR SERPL: 26 % (ref 15–46)
IRON SERPL-MCNC: 75 UG/DL (ref 61–157)
MCH RBC QN AUTO: 27.9 PG (ref 26.5–33)
MCHC RBC AUTO-ENTMCNC: 33.1 G/DL (ref 31.5–36.5)
MCV RBC AUTO: 84 FL (ref 78–100)
PLATELET # BLD AUTO: 242 10E3/UL (ref 150–450)
POTASSIUM SERPL-SCNC: 4.3 MMOL/L (ref 3.4–5.3)
PROT SERPL-MCNC: 7.3 G/DL (ref 6.4–8.3)
RBC # BLD AUTO: 5.38 10E6/UL (ref 4.4–5.9)
SODIUM SERPL-SCNC: 140 MMOL/L (ref 136–145)
WBC # BLD AUTO: 6.8 10E3/UL (ref 4–11)

## 2023-08-07 PROCEDURE — 82248 BILIRUBIN DIRECT: CPT

## 2023-08-07 PROCEDURE — 83540 ASSAY OF IRON: CPT

## 2023-08-07 PROCEDURE — 83550 IRON BINDING TEST: CPT

## 2023-08-07 PROCEDURE — 86708 HEPATITIS A ANTIBODY: CPT

## 2023-08-07 PROCEDURE — 87340 HEPATITIS B SURFACE AG IA: CPT

## 2023-08-07 PROCEDURE — 86704 HEP B CORE ANTIBODY TOTAL: CPT

## 2023-08-07 PROCEDURE — 85610 PROTHROMBIN TIME: CPT

## 2023-08-07 PROCEDURE — 36415 COLL VENOUS BLD VENIPUNCTURE: CPT

## 2023-08-07 PROCEDURE — 83036 HEMOGLOBIN GLYCOSYLATED A1C: CPT

## 2023-08-07 PROCEDURE — 80053 COMPREHEN METABOLIC PANEL: CPT

## 2023-08-07 PROCEDURE — 86706 HEP B SURFACE ANTIBODY: CPT

## 2023-08-07 PROCEDURE — 86364 TISS TRNSGLTMNASE EA IG CLAS: CPT

## 2023-08-07 PROCEDURE — 85027 COMPLETE CBC AUTOMATED: CPT

## 2023-08-08 LAB
HAV IGG SER QL IA: NONREACTIVE
HBV CORE AB SERPL QL IA: NONREACTIVE
HBV SURFACE AB SERPL IA-ACNC: 0 M[IU]/ML
HBV SURFACE AB SERPL IA-ACNC: NONREACTIVE M[IU]/ML
HBV SURFACE AG SERPL QL IA: NONREACTIVE

## 2023-08-09 ENCOUNTER — OFFICE VISIT (OUTPATIENT)
Dept: GASTROENTEROLOGY | Facility: CLINIC | Age: 63
End: 2023-08-09
Attending: INTERNAL MEDICINE
Payer: COMMERCIAL

## 2023-08-09 ENCOUNTER — TRANSFERRED RECORDS (OUTPATIENT)
Dept: HEALTH INFORMATION MANAGEMENT | Facility: CLINIC | Age: 63
End: 2023-08-09

## 2023-08-09 VITALS
WEIGHT: 193 LBS | DIASTOLIC BLOOD PRESSURE: 99 MMHG | OXYGEN SATURATION: 98 % | BODY MASS INDEX: 26.92 KG/M2 | TEMPERATURE: 98.1 F | HEART RATE: 82 BPM | SYSTOLIC BLOOD PRESSURE: 137 MMHG

## 2023-08-09 DIAGNOSIS — K75.81 NONALCOHOLIC STEATOHEPATITIS (NASH): ICD-10-CM

## 2023-08-09 DIAGNOSIS — E66.3 OVERWEIGHT: ICD-10-CM

## 2023-08-09 DIAGNOSIS — Z92.29 HEPATITIS A AND HEPATITIS B VACCINE ADMINISTERED: ICD-10-CM

## 2023-08-09 DIAGNOSIS — K75.81 NONALCOHOLIC STEATOHEPATITIS (NASH): Primary | ICD-10-CM

## 2023-08-09 DIAGNOSIS — R19.7 DIARRHEA, UNSPECIFIED TYPE: ICD-10-CM

## 2023-08-09 PROBLEM — K76.0 NAFLD (NONALCOHOLIC FATTY LIVER DISEASE): Status: ACTIVE | Noted: 2023-01-01

## 2023-08-09 LAB
HBA1C MFR BLD: 5.9 % (ref 0–5.6)
TTG IGA SER-ACNC: 1.9 U/ML
TTG IGG SER-ACNC: 0.7 U/ML

## 2023-08-09 PROCEDURE — 99214 OFFICE O/P EST MOD 30 MIN: CPT | Performed by: INTERNAL MEDICINE

## 2023-08-09 PROCEDURE — 250N000011 HC RX IP 250 OP 636: Performed by: INTERNAL MEDICINE

## 2023-08-09 PROCEDURE — 90636 HEP A/HEP B VACC ADULT IM: CPT | Performed by: INTERNAL MEDICINE

## 2023-08-09 PROCEDURE — 91200 LIVER ELASTOGRAPHY: CPT | Mod: 26 | Performed by: PHYSICIAN ASSISTANT

## 2023-08-09 PROCEDURE — 90471 IMMUNIZATION ADMIN: CPT | Performed by: INTERNAL MEDICINE

## 2023-08-09 PROCEDURE — 99213 OFFICE O/P EST LOW 20 MIN: CPT | Performed by: INTERNAL MEDICINE

## 2023-08-09 RX ADMIN — HEPATITIS A AND HEPATITIS B (RECOMBINANT) VACCINE 1 ML: 720; 20 INJECTION, SUSPENSION INTRAMUSCULAR at 09:21

## 2023-08-09 ASSESSMENT — PAIN SCALES - GENERAL: PAINLEVEL: NO PAIN (0)

## 2023-08-09 NOTE — PATIENT INSTRUCTIONS
- Recommend keeping a food, symptom and stool diary given symptoms  - Referral to general GI  - Continue 25 grams of fiber per day  - Recommend no more than 2 drinks on one occasion  - Your liver fat will improve if you are able to lose 5% of your total body weight over the next year with dietary and lifestyle changes, such as reducing carbohydrates and sugars  - Continue your hepatitis A and B vaccination series with your primary care doctor

## 2023-08-09 NOTE — LETTER
8/9/2023         RE: Maximino Linares  [id number]  [facility] [place] [address]        Dear Colleague,    Thank you for referring your patient, Maximino Linares, to the [facility] [place]. Please see a copy of my visit note below.    [facility]    Assessment  63 year old male with metabolic dysfunction associated steatotic liver disease (MASLD; formerly NAFLD), hyperlipidemia, obstructive sleep apnea and history of cardiac arrythmia.     #. Metabolic dysfunction associated steatotic liver disease (MASLD; formerly NAFLD)  #. Hyperlipidemia  #. Overweight  Patient has metabolic dysfunction associated steatotic liver disease.  He will intermittently consume alcohol but no more than 2 drinks per day. AST and ALT are technically above what is normal of ~30s. Imaging demonstrates hepatic steatosis.  His platelet count is normal which argues against portal hypertension.  His INR is normal which suggests intact synthetic function.  Given these findings I do not suspect that he has advanced fibrosis.    Fibroscan done in clinic with no evidence of fibrosis, but S3 steatosis.  We spent the majority of the visit discussing management of his metabolic syndrome which includes weight loss with goal 5% weight loss, blood pressure management and hyperlipidemia management.  He is currently on a statin.    #. Abdominal cramping  #. Loose stools  #. Intermittent fecal incontinence  Patient developed abdominal cramping, loose stools and intermittent fecal incontinence after October 2022.  He has been tested for C. Difficile and other enteric pathogen's in January which were negative.  His CRP at that time was less than 3.  His ESR was 7.  He had a mildly elevated fecal calprotectin at 100 but he had a recent colonoscopy in October 2022 which showed evidence of a normal endoscopic appearance.  His TSH was normal. No unintentional weight loss. May be related to post infectious  irritable bowel syndrome, but would benefit from complete workup.     Plan  - Follow up HgA1c + TTg IgA/IgG  - Referral to general GI for abdominal symptoms; food + symptom diary pending appointment + increase dietary fiber to 25 grams per day  - Goal for 5% weight loss over the course of the year for management of fatty changes  - Limit alcohol to no more than 2 drinks per occasion, only 2-3 days per week  - Continue statin for hyperlipidemia management     Health Maintenance:   - Hepatitis A non-immune: Twinrix today; complete series with PCP   - Hepatitis B non-immune: Twinrix today; complete series with PCP   - Colon cancer screening: due 2027    RTC: PRN    Given the patient does not have any evidence of fibrosis he does not need to continue to see hepatology.  Would recommend repeating liver studies yearly and a FibroScan every 2 to 3 years to assess for steatosis and fibrosis. Hepatology is happy to see the patient back in clinic if anything were to change.     Nayla Ritter MD (Lizzie)  [facility] Trans[facility] Hepatology  [facility]      HPI:  His biggest concern is that he has been having issues with his bowel habits since his colonoscopy in October 2022.  He reports that prior to his colonoscopy he had no issues with frequency or consistency of his stool.  After that he developed more loose stools and has had intermittent fecal incontinence.  He denies any melena but occasionally he will have blood in his stool.  He also reports abdominal cramping and bloating which is sometimes related to his bowel habits but not always.  He denies any unintentional weight loss.  He is unclear of what particular foods may cause abdominal discomfort but he has identified that fatty foods cause discomfort.  He denies any recent camping or hunting.  He fishes intermittently the last of which was in the spring when he went to [place].    He denies any signs or symptoms of decompensated liver  disease such as jaundice, abdominal distention or confusion.  He has no family history of liver disease.  He takes over-the-counter fiber, Tums and a probiotic.    Medical hx Surgical hx   Past Medical History:   Diagnosis Date    Cardiac arrhythmia, unspecified cardiac arrhythmia type     benign, extensive work up    Gastroesophageal reflux disease without esophagitis     Hiatal hernia     History of skin cancer     SCC, BCC    Hyperlipidemia LDL goal <130     Mild intermittent asthma without complication     resolved    Nonalcoholic steatohepatitis (REYNOSO) 2023    MAYITO (obstructive sleep apnea)       Past Surgical History:   Procedure Laterality Date    COLONOSCOPY N/A 10/17/2022    Procedure: COLONOSCOPY;  Surgeon: Oneil Cabral MD;  Location:  GI    ESOPHAGOSCOPY, GASTROSCOPY, DUODENOSCOPY (EGD), COMBINED N/A 10/17/2022    Procedure: ESOPHAGOGASTRODUODENOSCOPY (EGD);  Surgeon: Oneil Cabral MD;  Location:  GI    SURGICAL HISTORY OF -  Right     x 2, 2000, 2005, arthroscopy, ACL reconstruction    SURGICAL HISTORY OF -   2017    Deviated septum/soft palpate repair   Denies any abdominal surgeries       Medications  Current Outpatient Medications   Medication Sig Dispense Refill    aspirin (ASA) 81 MG chewable tablet Take 81 mg by mouth daily      esomeprazole (NEXIUM) 20 MG DR capsule Take 1 capsule (20 mg) by mouth every morning (before breakfast) Take 30-60 minutes before eating. 90 capsule 1    rosuvastatin (CRESTOR) 40 MG tablet Take 1 tablet (40 mg) by mouth daily 90 tablet 3    sildenafil (VIAGRA) 50 MG tablet Take 1 tablet (50 mg) by mouth daily as needed (30-60 minutes before intercourse) 30 tablet 3       Allergies  Allergies   Allergen Reactions    Morphine Nausea and Vomiting       Family hx Social hx   Family History   Problem Relation Age of Onset    Colon Cancer Mother 68    Rectal Cancer Mother     Abdominal Aortic Aneurysm Father     Bladder Cancer Father 75    Gallbladder Disease  Father     Prostate Cancer Father     Arrhythmia Sister     Heart Failure Maternal Grandmother     Lung Cancer Maternal Grandfather         smoker/ship     Heart Failure Paternal Grandmother     Alcoholism Paternal Grandfather     Obesity Paternal Grandfather     Abdominal Aortic Aneurysm Paternal Uncle    No family history of liver disease   - Employment: Construction  - Lives with wife  - 3 kids  - Alcohol: 1-2 drinks a few times per week, often weekends  - Tobacco: Former chewing + smoking, last 2014  - Drugs: Denies     Review of systems  A 10-point review of systems was negative.    Examination  BP (!) 137/99   Pulse 82   Temp 98.1  F (36.7  C) (Oral)   Wt 87.5 kg (193 lb)   SpO2 98%   BMI 26.92 kg/m     Body mass index is 26.92 kg/m .    Gen-NAD  Eye- EOMI  ENT- MMM, normal oropharynx  CVS- RRR, no murmurs  RS- CTA bilaterally  Abd- Overweight, soft, non-tender, non-distended   Extr- 2+ radial pulses bilaterally, no lower extremity edema bilaterally  MS- hands without clubbing  Neuro- A+Ox3  Skin- no jaundice  Psych- normal mood    Laboratory  BMP  Recent Labs   Lab Test 08/07/23  1042 05/15/23  1419 01/04/23  1655 10/04/22  1330    141 140 138   POTASSIUM 4.3 4.4 4.0 4.2   CHLORIDE 105 105 104 105   SWETA 9.6 9.8 9.3 9.1   CO2 26 23 19* 28   BUN 14.0 17.4 14.7 16   CR 0.91 0.90 0.97 0.83   GLC 93 78 104* 97     CBC  Recent Labs   Lab Test 08/07/23  1042 05/15/23  1419 01/04/23  1655 10/04/22  1330   WBC 6.8 7.9 10.6 6.4   RBC 5.38 5.36 5.02 5.12   HGB 15.0 15.0 14.2 14.7   HCT 45.3 44.1 42.0 42.5   MCV 84 82 84 83   MCH 27.9 28.0 28.3 28.7   MCHC 33.1 34.0 33.8 34.6   RDW 12.9 12.6 13.0 12.7    252 251 234     Liver Function Studies -   Recent Labs   Lab Test 08/07/23  1042   PROTTOTAL 7.3   ALBUMIN 4.5   BILITOTAL 0.4   ALKPHOS 73   AST 37   ALT 48      Hepatitis A IgG non-reactive  Hepatitis B s Ag non-reactive  Hepatitis B s Ab 0  Iron saturation 26%    Previous labs reviewed    Hepatitis B s Ab 0.38  Hepatitis C Ab non-reactive  TSH wnl (2023)    Fibroscan 8/9/2023   29% IQR  2.9 kPa 12% IQR     Radiology  Abdominal US 1/2023  1.  Hepatic steatosis. Mildly coarsened hepatic echotexture and slightly nodular contour suggesting hepatic parenchymal disease.     Imaging reports have been independently reviewed.       Again, thank you for allowing me to participate in the care of your patient.        Sincerely,        Nayla Ritter MD

## 2023-10-11 ENCOUNTER — OFFICE VISIT (OUTPATIENT)
Dept: GASTROENTEROLOGY | Facility: CLINIC | Age: 63
End: 2023-10-11
Attending: INTERNAL MEDICINE
Payer: COMMERCIAL

## 2023-10-11 VITALS
HEART RATE: 83 BPM | SYSTOLIC BLOOD PRESSURE: 133 MMHG | DIASTOLIC BLOOD PRESSURE: 88 MMHG | OXYGEN SATURATION: 99 % | HEIGHT: 71 IN | BODY MASS INDEX: 27.52 KG/M2 | WEIGHT: 196.6 LBS

## 2023-10-11 DIAGNOSIS — R19.7 DIARRHEA, UNSPECIFIED TYPE: ICD-10-CM

## 2023-10-11 DIAGNOSIS — R15.1 FECAL SMEARING: Primary | ICD-10-CM

## 2023-10-11 DIAGNOSIS — K21.9 GASTROESOPHAGEAL REFLUX DISEASE WITHOUT ESOPHAGITIS: ICD-10-CM

## 2023-10-11 PROCEDURE — 99204 OFFICE O/P NEW MOD 45 MIN: CPT | Mod: GC | Performed by: STUDENT IN AN ORGANIZED HEALTH CARE EDUCATION/TRAINING PROGRAM

## 2023-10-11 ASSESSMENT — PAIN SCALES - GENERAL: PAINLEVEL: NO PAIN (0)

## 2023-10-11 NOTE — LETTER
10/11/2023         RE: Maximino Linares  [address]  [place] [address]        Dear Colleague,    Thank you for referring your patient, Maximino Linares, to the [facility] [place]. Please see a copy of my visit note below.    GI CLINIC VISIT    CC/REFERRING MD:  Dr. Ritter (Liver clinic)  REASON FOR CONSULTATION: bowel habit change after colonoscopy.    ASSESSMENT/PLAN: 62 yo M with MASLD, hyperlipidemia, obstructive sleep apnea, who is referred for abdominal cramping and diarrhea since 10/2022 after colonoscopy.    # ?Fecal incontinence, passive vs. Perianal secretion/sweat  His most concerning symptom is leaking of content at perianal area. By history, it never has any fecal content, but only mucous or brown staining and the anal sphincter is intact on exam make the true fecal incontinence and pelvic floor dysfunction less likely. Differential diagnosis including hemorrhoid that could secrete clear liquid (though not seen on rectal exam or recent colonoscopy), sweat, and less likely intermittent loss of sphincter tone, or overflow diarrhea.    # Irregular bowel movement  Started since 10/2022 after colonoscopy. Does not meet criteria for IBS-D MONSERRAT given the pain is more likely upper GI/GERD related with improvement after gaviscon and not usually improved/related to bowel movement. Overall, the current bowel movement is not concerning (normal consistency). If worsening in the future, could consider repeat colonoscopy ruling out microscopic colitis.    Tissue transglutaminase IgA and IgG negative. Has prediabetes from A1c. TSH normal. Enteric panel and C.diff, ova parasite x1 neg 1/2023. Fecal diana was mildly positive 1/13/23 at 105 - only mild, no further work-up required with also recent normal colonoscopy. CRP normal.    # GERD   # LLQ abdominal pain  Controlled with esomeprazole 20 mg daily at this time. There is some breakthrough symptom that improves his  abdominal pain, so could have dyspepsia     Plan  -- Please take photo of your leaking and send it to us through [employer]. Please also make a diary on the bowel movement, leaking, pain, and diet  -- Please increase metamucil from 2 tablespoon a day to 3 tablespoon a day (can also all at once or separate, timing does not matter). This will help bulk up your stool.   -- Your rectal exam reveals normal pelvic floor function on the screening rectal exam, we will holding off on pelvic floor referral.  -- For your abdominal pain, please take tums right away after the pain. This should help if the pain is related to acid refluxes/stomach pain.    RTC: 3 months.    Patient is seen and discussed with Dr. Nancy Pineda.  .  Edgar Ledezma MD  GI fellow    --  HPI 62 yo M here for irregular bowel movement since 10/2022.    Since EGD and colonoscopy 10/2022 had irregular bowel movement with fecal incontinence. Changes noticed immediately after the colonoscopy. No prior leaking in the past. Initially after colonoscopy, had once daily leaking and now with twice a week leaking. When sitting for long period, would notice wet sensation at perianal area, then will see mucous or small amount of brown when wiped or in the underwear. If physically active, will have no leaking.    Prior to colonoscopy had [place] 4, 1-2 bowel movements a day. After colonoscopy, had a bowel movement in the morning, then 1 hr later had another bowel movement up to about 3-4 bowel movements a day. Feeling some incomplete evacuation. Lumpy stool [place] 3-4, never has loose/watery stool. No night time bowel movement. Bowel movement related to ice cream. Cheese does not bother him. Not related to milk.    Pain at LUQ, sharp pain/ gas pain, resolved with gaviscon/antacid (taking before bed) or eating banana/yogurt. 2-3 times/week. Feeling thi in the morning, lasts for a few hours. Does sometimes improves with bowel movement.    Acid reflux ongoing for  years, has been on esomeprazole. Recently changed from protonix to esomeprazole due to was told related to arrythmia.   Denies weight loss, fever/chills.    Fiber - metamucil - 2 large table spoon once a day. 3 months. Helps with bloating and incontinence.    PREVIOUS ENDOSCOPY:  - EGD 10/2022 for heartburn with medium size hiatal hernia, otherwise normal. No biopsy was done.  - colonoscopy 10/2022 for CRC screening, FH of CRC with diverticulosis, otherwise normal TI and colon. No biopsy was done.    ROS:  10 points review of system were negative apart from noted in HPI.    PERTINENT PAST MEDICAL HISTORY:  Past Medical History:   Diagnosis Date    Cardiac arrhythmia, unspecified cardiac arrhythmia type     benign, extensive work up    Gastroesophageal reflux disease without esophagitis     Hiatal hernia     History of skin cancer     SCC, BCC    Hyperlipidemia LDL goal <130     Mild intermittent asthma without complication     resolved    NAFLD (nonalcoholic fatty liver disease) 2023    Metabolic and steatoic liver disease -  Hepatology - Dr Ritter    MAYITO (obstructive sleep apnea)        PREVIOUS SURGERIES:  -     ALLERGIES:    PERTINENT MEDICATIONS:    Current Outpatient Medications:     aspirin (ASA) 81 MG chewable tablet, Take 81 mg by mouth daily, Disp: , Rfl:     esomeprazole (NEXIUM) 20 MG DR capsule, Take 1 capsule (20 mg) by mouth every morning (before breakfast) Take 30-60 minutes before eating., Disp: 90 capsule, Rfl: 1    rosuvastatin (CRESTOR) 40 MG tablet, Take 1 tablet (40 mg) by mouth daily, Disp: 90 tablet, Rfl: 3    sildenafil (VIAGRA) 50 MG tablet, Take 1 tablet (50 mg) by mouth daily as needed (30-60 minutes before intercourse), Disp: 30 tablet, Rfl: 3    SOCIAL HISTORY:  - never smokes, drinks 1-2 drink a week during weekends  - works 12-14 hours shift    FAMILY HISTORY:  + FH of colon cancer    PHYSICAL EXAMINATION:  Vitals reviewed: There were no vitals taken for this visit.  Wt:   Wt Readings  from Last 2 Encounters:   08/09/23 87.5 kg (193 lb)   05/15/23 88.1 kg (194 lb 3.2 oz)      Constitutional: aaox3, cooperative, pleasant, not dyspneic/diaphoretic, no acute distress  HEENT: Sclera anicteric, not injected  CV: No edema  Respiratory: Unlabored breathing  Abd: Non distended, nontender, no peritoneal signs  Skin: warm, perfused, no jaundice  Psych: Normal affect  Rectal exam: no perianal lesion apart from anterior anal area scaring (likely healed hemorrhoid), no perianal fistula or leaking seen. Intact anal sphincter sensation/reflex. Intact appropriate pelvic floor descending when asked to bear down (less likely having pelvic floor dysfunction).     PERTINENT STUDIES:          Again, thank you for allowing me to participate in the care of your patient.      Sincerely,    Edgar Ledezma MD

## 2023-10-11 NOTE — PROGRESS NOTES
GI CLINIC VISIT    CC/REFERRING MD:  Dr. Ritter (Liver clinic)  REASON FOR CONSULTATION: bowel habit change after colonoscopy.    ASSESSMENT/PLAN: 64 yo M with MASLD, hyperlipidemia, obstructive sleep apnea, who is referred for abdominal cramping and diarrhea since 10/2022 after colonoscopy.    # ?Fecal incontinence, passive vs. Perianal secretion/sweat  His most concerning symptom is leaking of content at perianal area. By history, it never has any fecal content, but only mucous or brown staining and the anal sphincter is intact on exam make the true fecal incontinence and pelvic floor dysfunction less likely. Differential diagnosis including hemorrhoid that could secrete clear liquid (though not seen on rectal exam or recent colonoscopy), sweat, and less likely intermittent loss of sphincter tone, or overflow diarrhea.    # Irregular bowel movement  Started since 10/2022 after colonoscopy. Does not meet criteria for IBS-D MONSERRAT given the pain is more likely upper GI/GERD related with improvement after gaviscon and not usually improved/related to bowel movement. Overall, the current bowel movement is not concerning (normal consistency). If worsening in the future, could consider repeat colonoscopy ruling out microscopic colitis.    Tissue transglutaminase IgA and IgG negative. Has prediabetes from A1c. TSH normal. Enteric panel and C.diff, ova parasite x1 neg 1/2023. Fecal diana was mildly positive 1/13/23 at 105 - only mild, no further work-up required with also recent normal colonoscopy. CRP normal.    # GERD   # LLQ abdominal pain  Controlled with esomeprazole 20 mg daily at this time. There is some breakthrough symptom that improves his abdominal pain, so could have dyspepsia     Plan  -- Please take photo of your leaking and send it to us through [employer]. Please also make a diary on the bowel movement, leaking, pain, and diet  -- Please increase metamucil from 2 tablespoon a day to 3 tablespoon a day (can also all  at once or separate, timing does not matter). This will help bulk up your stool.   -- Your rectal exam reveals normal pelvic floor function on the screening rectal exam, we will holding off on pelvic floor referral.  -- For your abdominal pain, please take tums right away after the pain. This should help if the pain is related to acid refluxes/stomach pain.    RTC: 3 months.    Patient is seen and discussed with Dr. Nancy Pineda.  .  Edgar Ledezma MD  GI fellow    --  HPI 62 yo M here for irregular bowel movement since 10/2022.    Since EGD and colonoscopy 10/2022 had irregular bowel movement with fecal incontinence. Changes noticed immediately after the colonoscopy. No prior leaking in the past. Initially after colonoscopy, had once daily leaking and now with twice a week leaking. When sitting for long period, would notice wet sensation at perianal area, then will see mucous or small amount of brown when wiped or in the underwear. If physically active, will have no leaking.    Prior to colonoscopy had [place] 4, 1-2 bowel movements a day. After colonoscopy, had a bowel movement in the morning, then 1 hr later had another bowel movement up to about 3-4 bowel movements a day. Feeling some incomplete evacuation. Lumpy stool [place] 3-4, never has loose/watery stool. No night time bowel movement. Bowel movement related to ice cream. Cheese does not bother him. Not related to milk.    Pain at LUQ, sharp pain/ gas pain, resolved with gaviscon/antacid (taking before bed) or eating banana/yogurt. 2-3 times/week. Feeling thi in the morning, lasts for a few hours. Does sometimes improves with bowel movement.    Acid reflux ongoing for years, has been on esomeprazole. Recently changed from protonix to esomeprazole due to was told related to arrythmia.   Denies weight loss, fever/chills.    Fiber - metamucil - 2 large table spoon once a day. 3 months. Helps with bloating and incontinence.    PREVIOUS ENDOSCOPY:  -  EGD 10/2022 for heartburn with medium size hiatal hernia, otherwise normal. No biopsy was done.  - colonoscopy 10/2022 for CRC screening, FH of CRC with diverticulosis, otherwise normal TI and colon. No biopsy was done.    ROS:  10 points review of system were negative apart from noted in HPI.    PERTINENT PAST MEDICAL HISTORY:  Past Medical History:   Diagnosis Date    Cardiac arrhythmia, unspecified cardiac arrhythmia type     benign, extensive work up    Gastroesophageal reflux disease without esophagitis     Hiatal hernia     History of skin cancer     SCC, BCC    Hyperlipidemia LDL goal <130     Mild intermittent asthma without complication     resolved    NAFLD (nonalcoholic fatty liver disease) 2023    Metabolic and steatoic liver disease -  Hepatology - Dr Ritter    MAYITO (obstructive sleep apnea)        PREVIOUS SURGERIES:  -     ALLERGIES:    PERTINENT MEDICATIONS:    Current Outpatient Medications:     aspirin (ASA) 81 MG chewable tablet, Take 81 mg by mouth daily, Disp: , Rfl:     esomeprazole (NEXIUM) 20 MG DR capsule, Take 1 capsule (20 mg) by mouth every morning (before breakfast) Take 30-60 minutes before eating., Disp: 90 capsule, Rfl: 1    rosuvastatin (CRESTOR) 40 MG tablet, Take 1 tablet (40 mg) by mouth daily, Disp: 90 tablet, Rfl: 3    sildenafil (VIAGRA) 50 MG tablet, Take 1 tablet (50 mg) by mouth daily as needed (30-60 minutes before intercourse), Disp: 30 tablet, Rfl: 3    SOCIAL HISTORY:  - never smokes, drinks 1-2 drink a week during weekends  - works 12-14 hours shift    FAMILY HISTORY:  + FH of colon cancer    PHYSICAL EXAMINATION:  Vitals reviewed: There were no vitals taken for this visit.  Wt:   Wt Readings from Last 2 Encounters:   08/09/23 87.5 kg (193 lb)   05/15/23 88.1 kg (194 lb 3.2 oz)      Constitutional: aaox3, cooperative, pleasant, not dyspneic/diaphoretic, no acute distress  HEENT: Sclera anicteric, not injected  CV: No edema  Respiratory: Unlabored breathing  Abd: Non  distended, nontender, no peritoneal signs  Skin: warm, perfused, no jaundice  Psych: Normal affect  Rectal exam: no perianal lesion apart from anterior anal area scaring (likely healed hemorrhoid), no perianal fistula or leaking seen. Intact anal sphincter sensation/reflex. Intact appropriate pelvic floor descending when asked to bear down (less likely having pelvic floor dysfunction).     PERTINENT STUDIES:

## 2023-10-11 NOTE — PATIENT INSTRUCTIONS
It was a pleasure taking care of you today.  I've included a brief summary of our discussion and care plan from today's visit below.  Please review this information with your primary care provider.  _______________________________________________________________________    Your symptom of rectal leakage with mucous, when we did the rectal exam, there was no sign of pelvic floor dysfunction or weak anal sphincter. We suspected that it might be some clear fluid related to hemorrhoid/glands around the perianal area. Your stool consistency is within normal limit which does not need any further intervention.    My recommendations are summarized as follows:  -- Please take photo of your leaking and send it to us through [employer]. Please also make a diary on the bowel movement, leaking, pain, and diet  -- Please increase metamucil from 2 tablespoon a day to 3 tablespoon a day (can also all at once or separate, timing does not matter). This will help bulk up your stool.   -- Your rectal exam reveals normal pelvic floor function on the screening rectal exam, we will holding off on pelvic floor referral.  -- For your abdominal pain, please take tums right away after the pain. This should help if the pain is related to acid refluxes/stomach pain.  --  If you have any questions, please don't hesitate to contact me through our GI RN Clinic Coordinator, Mary Anne Carvajal, at [phone].    Return to GI Clinic in 3 months    _______________________________________________________________________    Who do I call with any questions after my visit?  Please be in touch if there are any further questions that arise following today's visit.  There are multiple ways to contact your gastroenterology care team.    During business hours, you may reach a Gastroenterology nurse at [phone] and choose option 3.     To schedule or reschedule an appointment, please call [phone].   You can always send a secure message through [email or web address].   [employer] messages are answered by your nurse or doctor typically within 24 hours.  Please allow extra time on weekends and holidays.    For urgent/emergent questions after business hours, you may reach the on-call GI Fellow by contacting the [facility]  at [phone].     How will I get the results of any tests ordered?    You will receive all of your results.  If you have signed up for [employer], any tests ordered at your visit will be available to you after your physician reviews them.  Typically this takes 1-2 weeks.  If there are urgent results that require a change in your care plan, your physician or nurse will call you to discuss the next steps.      What is Ent[employer]me?  Ent[employer] is a secure way for you to access all of your healthcare records from the [facility].  It is a web based computer program, so you can sign on to it from any location.  It also allows you to send secure messages to your care team.  I recommend signing up for Entrenarme access if you have not already done so and are comfortable with using a computer.      How do I schedule labs, imaging studies, or procedures that were ordered in clinic today?   - Labs: To schedule lab appointment at the [facility], use my chart or call [phone]. If you have a [facility] closer to home where you are regularly seen you can give them a call.   - Procedures: If a colonoscopy, upper endoscopy, breath test, esophageal manometry, or pH impedence was ordered today, our endoscopy team will call you to schedule this. If you have not heard from our endoscopy team within a week, please call [phone] to schedule.   - Imaging Studies: If you were scheduled for a CT scan, X-ray, MRI, ultrasound, HIDA scan or other imaging study, please call [phone] to have this scheduled.   - Referral: If a referral to another specialty was ordered, expect a phone call or follow instructions above. If you have not heard from  anyone regarding your referral in a week, please call our clinic to check the status.     Sincerely,    Edgar Ledezma MD  GI Fellow  [facility], Division of Gastroenterology

## 2023-10-11 NOTE — NURSING NOTE
"Chief Complaint   Patient presents with    New Patient       Vitals:    10/11/23 1529   BP: 133/88   Pulse: 83   SpO2: 99%   Weight: 89.2 kg (196 lb 9.6 oz)   Height: 1.803 m (5' 11\")       Body mass index is 27.42 kg/m .    [employer]    "

## 2023-10-19 ENCOUNTER — TELEPHONE (OUTPATIENT)
Dept: GASTROENTEROLOGY | Facility: CLINIC | Age: 63
End: 2023-10-19
Payer: COMMERCIAL

## 2023-10-19 NOTE — TELEPHONE ENCOUNTER
MYRNA and sent mychart-2nd attempt    Schedule:  3 mo follow-up appointment with Dr. Ledezma (around Jan 2024). RTN GI, in person or virtual okay per the follow-up order

## 2024-01-30 NOTE — PROGRESS NOTES
ATTESTATION:      Physician Attestation   I, Nancy Pineda MD, saw this patient and agree with the findings and plan of care as documented in the note.      Items personally reviewed/procedural attestation: vitals.    Nancy Pineda MD      I spent 20 minutes with the patient, of which > 50% was spent face-to-face with the patient in education, counseling, and addressing questions regarding the above diagnoses.   An additional 20 minutes was spent on the date of the encounter doing chart review (notes, labs, imaging reports, endoscopic and pathology reports, clinical status events, medications, etc)  documentation, care coodination, and arranging of care plan with the fellow.      ----------------------------    GI CLINIC VISIT    CC/REFERRING MD:  Dr. Ritter (Liver clinic)  REASON FOR CONSULTATION: bowel habit change after colonoscopy.    ASSESSMENT/PLAN: 62 yo M with MASLD, hyperlipidemia, obstructive sleep apnea, who is referred for abdominal cramping and diarrhea since 10/2022 after colonoscopy.    # Possible fecal incontinence, passive, resolved  His most concerning symptom is leaking of content at perianal area. By history, it never has any fecal content, but only mucous or brown staining and the anal sphincter is intact on exam make the true fecal incontinence and pelvic floor dysfunction less likely. Now resolved while restarted on probiotics and this started since colonoscopy and after been told to stop the probiotics; so could be some degree of relationship with gut microbiomes changes. Continue probiotics for now.    # Irregular bowel movement - improved  Started since 10/2022 after colonoscopy. Does not meet criteria for IBS-D MONSERRAT given the pain is more likely upper GI/GERD related with improvement after gaviscon and not usually improved/related to bowel movement. Tissue transglutaminase IgA and IgG negative. Has prediabetes from A1c. TSH normal. Enteric panel and C.diff, ova  parasite x1 neg 1/2023. Fecal diana was mildly positive 1/13/23 at 105 - only mild, no further work-up required with also recent normal colonoscopy. CRP normal.Overall, the current bowel movement is not concerning (normal consistency). Pt feels this has further improved with restart of probiotics.      # GERD   Controlled with esomeprazole 20 mg daily at this time. As needed Gaviscon/tums.    Plan  -- Continue esomeprazole 20 mg daily for abdominal discomfort and acid regurgitation (GERD). This medication works best when you take it in empty stomach, and then 30-60 minutes have a meal afterward.  -- Continue as needed Gaviscon/tums.  -- For bloating, please do low gas diet, avoid carbonated drink, avoid straw, avoid gum or hard candy. Also, for diaphragmatic breathing, this could be used 1-2 times a day (LINK:   [email or web address] )  -- Given probiotics help you a lot with fecal incontinence, would continue and can taper down as tolerates    RTC: as needed  Patient is seen and discussed with Dr. Nancy Pineda.  Edgar Ledezma MD  GI fellow    --  HPI 64 yo M here for follow-up of irregular bowel movement (onset 10/2022). Last seen in the clinic 10/2023.    Started since EGD and colonoscopy 10/2022 had irregular bowel movement with fecal incontinence. Changes noticed immediately after the colonoscopy. No prior leaking in the past. Initially after colonoscopy, had once daily leaking and now with twice a week leaking. When sitting for long period, would notice wet sensation at perianal area, then will see mucous or small amount of brown when wiped or in the underwear. If physically active, will have no leaking.    Prior to colonoscopy had [place] 4, 1-2 bowel movements a day. After colonoscopy, had a bowel movement in the morning, then 1 hr later had another bowel movement up to about 3-4 bowel movements a day. Feeling  some incomplete evacuation. Lumpy stool [place] 3-4, never has loose/watery stool. No night time bowel movement. Bowel movement related to ice cream. Cheese does not bother him. Not related to milk.    Pain at LUQ, sharp pain/ gas pain, resolved with gaviscon/antacid (taking before bed) or eating banana/yogurt. 2-3 times/week. Feeling thi in the morning, lasts for a few hours. Does sometimes improves with bowel movement.    Acid reflux ongoing for years, has been on esomeprazole. Recently changed from protonix to esomeprazole due to was told related to arrythmia.   Denies weight loss, fever/chills.    Interval history 1/31/24:  Has 1-3 bowel movements a day. He resumed his probiotics, and increased dose to twice [Is taking: Digestive Advantage (2 in am), [place] Extra strength Probiotics, Gaviscon BID] with now resolution of the fecal leaking completely.  Quitted taking the metamucil as it causes bloating.   No longer having any leakage of content in the underwear.    PREVIOUS ENDOSCOPY:  - EGD 10/2022 for heartburn with medium size hiatal hernia, otherwise normal. No biopsy was done.  - colonoscopy 10/2022 for CRC screening, FH of CRC with diverticulosis, otherwise normal TI and colon. No biopsy was done.    ROS:  10 points review of system were negative apart from noted in HPI.    PERTINENT PAST MEDICAL HISTORY:  Past Medical History:   Diagnosis Date    Cardiac arrhythmia, unspecified cardiac arrhythmia type     benign, extensive work up    Gastroesophageal reflux disease without esophagitis     Hiatal hernia     History of skin cancer     SCC, BCC    Hyperlipidemia LDL goal <130     Mild intermittent asthma without complication     resolved    NAFLD (nonalcoholic fatty liver disease) 2023    Metabolic and steatoic liver disease -  Hepatology - Dr Ritter    MAYITO (obstructive sleep apnea)      PREVIOUS SURGERIES: No prior intra-abdominal surgery  ALLERGIES: reviewed.    PERTINENT MEDICATIONS:    Current  Outpatient Medications:     aspirin (ASA) 81 MG chewable tablet, Take 81 mg by mouth daily, Disp: , Rfl:     esomeprazole (NEXIUM) 20 MG DR capsule, Take 1 capsule (20 mg) by mouth every morning (before breakfast) Take 30-60 minutes before eating., Disp: 90 capsule, Rfl: 1    rosuvastatin (CRESTOR) 40 MG tablet, Take 1 tablet (40 mg) by mouth daily, Disp: 90 tablet, Rfl: 3    sildenafil (VIAGRA) 50 MG tablet, Take 1 tablet (50 mg) by mouth daily as needed (30-60 minutes before intercourse), Disp: 30 tablet, Rfl: 3    SOCIAL HISTORY:  - never smokes, drinks 1-2 drink a week during weekends  - works 12-14 hours shift    FAMILY HISTORY:  + FH of colon cancer, mother has rectal cancer at age 70s.   - Father has prostate and bladder cancer    PHYSICAL EXAMINATION:  Vitals reviewed: There were no vitals taken for this visit.  Wt:   Wt Readings from Last 2 Encounters:   10/11/23 89.2 kg (196 lb 9.6 oz)   08/09/23 87.5 kg (193 lb)      Constitutional: aaox3, cooperative, pleasant, not dyspneic/diaphoretic, no acute distress  HEENT: Sclera anicteric, not injected  CV: No edema  Respiratory: Unlabored breathing  Abd: Non distended, nontender, no peritoneal signs, presence of Diastasis Recti  Skin: warm, perfused, no jaundice  Psych: Normal affect    PERTINENT STUDIES:  Rectal exam (from 10/2023): no perianal lesion apart from anterior anal area scaring (likely healed hemorrhoid), no perianal fistula or leaking seen. Intact anal sphincter sensation/reflex. Intact appropriate pelvic floor descending when asked to bear down (less likely having pelvic floor dysfunction).

## 2024-01-31 ENCOUNTER — OFFICE VISIT (OUTPATIENT)
Dept: GASTROENTEROLOGY | Facility: CLINIC | Age: 64
End: 2024-01-31
Attending: STUDENT IN AN ORGANIZED HEALTH CARE EDUCATION/TRAINING PROGRAM
Payer: COMMERCIAL

## 2024-01-31 VITALS
BODY MASS INDEX: 28.25 KG/M2 | OXYGEN SATURATION: 96 % | SYSTOLIC BLOOD PRESSURE: 151 MMHG | DIASTOLIC BLOOD PRESSURE: 103 MMHG | WEIGHT: 201.8 LBS | HEIGHT: 71 IN | HEART RATE: 83 BPM

## 2024-01-31 DIAGNOSIS — R15.1 FECAL SMEARING: ICD-10-CM

## 2024-01-31 DIAGNOSIS — K21.9 GASTROESOPHAGEAL REFLUX DISEASE WITHOUT ESOPHAGITIS: Primary | ICD-10-CM

## 2024-01-31 PROCEDURE — 99215 OFFICE O/P EST HI 40 MIN: CPT | Mod: GC | Performed by: STUDENT IN AN ORGANIZED HEALTH CARE EDUCATION/TRAINING PROGRAM

## 2024-01-31 ASSESSMENT — PAIN SCALES - GENERAL: PAINLEVEL: NO PAIN (0)

## 2024-01-31 NOTE — LETTER
1/31/2024         RE: Maximino Linares  [id number]  [facility] [address]        Dear Colleague,    Thank you for referring your patient, Maximino Linares, to the [facility] [place]. Please see a copy of my visit note below.    ATTESTATION:      Physician Attestation  I, Nancy Pineda MD, saw this patient and agree with the findings and plan of care as documented in the note.      Items personally reviewed/procedural attestation: vitals.    Nancy Pineda MD      I spent 20 minutes with the patient, of which > 50% was spent face-to-face with the patient in education, counseling, and addressing questions regarding the above diagnoses.   An additional 20 minutes was spent on the date of the encounter doing chart review (notes, labs, imaging reports, endoscopic and pathology reports, clinical status events, medications, etc)  documentation, care coodination, and arranging of care plan with the fellow.      ----------------------------    GI CLINIC VISIT    CC/REFERRING MD:  Dr. Ritter (Liver clinic)  REASON FOR CONSULTATION: bowel habit change after colonoscopy.    ASSESSMENT/PLAN: 62 yo M with MASLD, hyperlipidemia, obstructive sleep apnea, who is referred for abdominal cramping and diarrhea since 10/2022 after colonoscopy.    # Possible fecal incontinence, passive, resolved  His most concerning symptom is leaking of content at perianal area. By history, it never has any fecal content, but only mucous or brown staining and the anal sphincter is intact on exam make the true fecal incontinence and pelvic floor dysfunction less likely. Now resolved while restarted on probiotics and this started since colonoscopy and after been told to stop the probiotics; so could be some degree of relationship with gut microbiomes changes. Continue probiotics for now.    # Irregular bowel movement - improved  Started since 10/2022 after colonoscopy. Does not meet  criteria for IBS-D MONSERRAT given the pain is more likely upper GI/GERD related with improvement after gaviscon and not usually improved/related to bowel movement. Tissue transglutaminase IgA and IgG negative. Has prediabetes from A1c. TSH normal. Enteric panel and C.diff, ova parasite x1 neg 1/2023. Fecal diana was mildly positive 1/13/23 at 105 - only mild, no further work-up required with also recent normal colonoscopy. CRP normal.Overall, the current bowel movement is not concerning (normal consistency). Pt feels this has further improved with restart of probiotics.      # GERD   Controlled with esomeprazole 20 mg daily at this time. As needed Gaviscon/tums.    Plan  -- Continue esomeprazole 20 mg daily for abdominal discomfort and acid regurgitation (GERD). This medication works best when you take it in empty stomach, and then 30-60 minutes have a meal afterward.  -- Continue as needed Gaviscon/tums.  -- For bloating, please do low gas diet, avoid carbonated drink, avoid straw, avoid gum or hard candy. Also, for diaphragmatic breathing, this could be used 1-2 times a day (LINK:   [email or web address]-breathing-[email or web address] )  -- Given probiotics help you a lot with fecal incontinence, would continue and can taper down as tolerates    RTC: as needed  Patient is seen and discussed with Dr. Nancy Pineda.  Edgar Ledezma MD  GI fellow    --  HPI 64 yo M here for follow-up of irregular bowel movement (onset 10/2022). Last seen in the clinic 10/2023.    Started since EGD and colonoscopy 10/2022 had irregular bowel movement with fecal incontinence. Changes noticed immediately after the colonoscopy. No prior leaking in the past. Initially after colonoscopy, had once daily leaking and now with twice a week leaking. When sitting for long period, would notice wet sensation at perianal area, then will see mucous or small amount of brown when wiped or  in the underwear. If physically active, will have no leaking.    Prior to colonoscopy had [place] 4, 1-2 bowel movements a day. After colonoscopy, had a bowel movement in the morning, then 1 hr later had another bowel movement up to about 3-4 bowel movements a day. Feeling some incomplete evacuation. Lumpy stool [place] 3-4, never has loose/watery stool. No night time bowel movement. Bowel movement related to ice cream. Cheese does not bother him. Not related to milk.    Pain at LUQ, sharp pain/ gas pain, resolved with gaviscon/antacid (taking before bed) or eating banana/yogurt. 2-3 times/week. Feeling thi in the morning, lasts for a few hours. Does sometimes improves with bowel movement.    Acid reflux ongoing for years, has been on esomeprazole. Recently changed from protonix to esomeprazole due to was told related to arrythmia.   Denies weight loss, fever/chills.    Interval history 1/31/24:  Has 1-3 bowel movements a day. He resumed his probiotics, and increased dose to twice [Is taking: Digestive Advantage (2 in am), [place] Extra strength Probiotics, Gaviscon BID] with now resolution of the fecal leaking completely.  Quitted taking the metamucil as it causes bloating.   No longer having any leakage of content in the underwear.    PREVIOUS ENDOSCOPY:  - EGD 10/2022 for heartburn with medium size hiatal hernia, otherwise normal. No biopsy was done.  - colonoscopy 10/2022 for CRC screening, FH of CRC with diverticulosis, otherwise normal TI and colon. No biopsy was done.    ROS:  10 points review of system were negative apart from noted in HPI.    PERTINENT PAST MEDICAL HISTORY:  Past Medical History:   Diagnosis Date    Cardiac arrhythmia, unspecified cardiac arrhythmia type     benign, extensive work up    Gastroesophageal reflux disease without esophagitis     Hiatal hernia     History of skin cancer     SCC, BCC    Hyperlipidemia LDL goal <130     Mild intermittent asthma without complication      resolved    NAFLD (nonalcoholic fatty liver disease) 2023    Metabolic and steatoic liver disease -  Hepatology - Dr Rittre    MAYITO (obstructive sleep apnea)      PREVIOUS SURGERIES: No prior intra-abdominal surgery  ALLERGIES: reviewed.    PERTINENT MEDICATIONS:    Current Outpatient Medications:     aspirin (ASA) 81 MG chewable tablet, Take 81 mg by mouth daily, Disp: , Rfl:     esomeprazole (NEXIUM) 20 MG DR capsule, Take 1 capsule (20 mg) by mouth every morning (before breakfast) Take 30-60 minutes before eating., Disp: 90 capsule, Rfl: 1    rosuvastatin (CRESTOR) 40 MG tablet, Take 1 tablet (40 mg) by mouth daily, Disp: 90 tablet, Rfl: 3    sildenafil (VIAGRA) 50 MG tablet, Take 1 tablet (50 mg) by mouth daily as needed (30-60 minutes before intercourse), Disp: 30 tablet, Rfl: 3    SOCIAL HISTORY:  - never smokes, drinks 1-2 drink a week during weekends  - works 12-14 hours shift    FAMILY HISTORY:  + FH of colon cancer, mother has rectal cancer at age 70s.   - Father has prostate and bladder cancer    PHYSICAL EXAMINATION:  Vitals reviewed: There were no vitals taken for this visit.  Wt:   Wt Readings from Last 2 Encounters:   10/11/23 89.2 kg (196 lb 9.6 oz)   08/09/23 87.5 kg (193 lb)      Constitutional: aaox3, cooperative, pleasant, not dyspneic/diaphoretic, no acute distress  HEENT: Sclera anicteric, not injected  CV: No edema  Respiratory: Unlabored breathing  Abd: Non distended, nontender, no peritoneal signs, presence of Diastasis Recti  Skin: warm, perfused, no jaundice  Psych: Normal affect    PERTINENT STUDIES:  Rectal exam (from 10/2023): no perianal lesion apart from anterior anal area scaring (likely healed hemorrhoid), no perianal fistula or leaking seen. Intact anal sphincter sensation/reflex. Intact appropriate pelvic floor descending when asked to bear down (less likely having pelvic floor dysfunction).         Again, thank you for allowing me to participate in the care of your patient.       Sincerely,    Edgar Ledezma MD

## 2024-01-31 NOTE — NURSING NOTE
"Chief Complaint   Patient presents with    Follow Up       Vitals:    01/31/24 1458   Pulse: 83   SpO2: 96%   Weight: 91.5 kg (201 lb 12.8 oz)   Height: 1.803 m (5' 11\")       Body mass index is 28.15 kg/m .    Blood pressure elevated. Provider notified. Recheck offered.     [employer]    "

## 2024-01-31 NOTE — PATIENT INSTRUCTIONS
It was a pleasure taking care of you today.  I've included a brief summary of our discussion and care plan from today's visit below.  Please review this information with your primary care provider.  _______________________________________________________________________    My recommendations are summarized as follows:  -- Continue esomeprazole 20 mg daily for abdominal discomfort and acid regurgitation (GERD). This medication works best when you take it in empty stomach, and then 30-60 minutes have a meal afterward  -- For bloating, please do low gas diet, avoid carbonated drink, avoid straw, avoid gum or hard candy. Also, for diaphragmatic breathing, this could be used 1-2 times a day (LINK:   [email or web address] )  -- Given probiotics help you a lot with fecal incontinence, would continue and can taper down as tolerates    --  If you have any questions, please don't hesitate to contact me through our GI RN Clinic Coordinator, Mary Anne Carvajal, at [phone].    Return to GI Clinic as needed   _______________________________________________________________________    Who do I call with any questions after my visit?  Please be in touch if there are any further questions that arise following today's visit.  There are multiple ways to contact your gastroenterology care team.    During business hours, you may reach a Gastroenterology nurse at [phone] and choose option 3.     To schedule or reschedule an appointment, please call [phone].   You can always send a secure message through [employer].  [employer] messages are answered by your nurse or doctor typically within 24 hours.  Please allow extra time on weekends and holidays.    For urgent/emergent questions after business hours, you may reach the on-call GI Fellow by contacting the [facility]  at [phone].     How will I get the results of any tests  ordered?    You will receive all of your results.  If you have signed up for [employer], any tests ordered at your visit will be available to you after your physician reviews them.  Typically this takes 1-2 weeks.  If there are urgent results that require a change in your care plan, your physician or nurse will call you to discuss the next steps.      What is [employer]?  [employer] is a secure way for you to access all of your healthcare records from the [facility].  It is a web based computer program, so you can sign on to it from any location.  It also allows you to send secure messages to your care team.  I recommend signing up for [facility] access if you have not already done so and are comfortable with using a computer.      How do I schedule labs, imaging studies, or procedures that were ordered in clinic today?   - Labs: To schedule lab appointment at the [facility], use my chart or call [phone]. If you have a [facility] lab closer to home where you are regularly seen you can give them a call.   - Procedures: If a colonoscopy, upper endoscopy, breath test, esophageal manometry, or pH impedence was ordered today, our endoscopy team will call you to schedule this. If you have not heard from our endoscopy team within a week, please call [phone] to schedule.   - Imaging Studies: If you were scheduled for a CT scan, X-ray, MRI, ultrasound, HIDA scan or other imaging study, please call [phone] to have this scheduled.   - Referral: If a referral to another specialty was ordered, expect a phone call or follow instructions above. If you have not heard from anyone regarding your referral in a week, please call our clinic to check the status.     Sincerely,    Edgar Ledezma MD  GI Fellow  [facility], Division of Gastroenterology

## 2024-05-14 SDOH — HEALTH STABILITY: PHYSICAL HEALTH: ON AVERAGE, HOW MANY DAYS PER WEEK DO YOU ENGAGE IN MODERATE TO STRENUOUS EXERCISE (LIKE A BRISK WALK)?: 3 DAYS

## 2024-05-14 SDOH — HEALTH STABILITY: PHYSICAL HEALTH: ON AVERAGE, HOW MANY MINUTES DO YOU ENGAGE IN EXERCISE AT THIS LEVEL?: 90 MIN

## 2024-05-14 ASSESSMENT — SOCIAL DETERMINANTS OF HEALTH (SDOH): HOW OFTEN DO YOU GET TOGETHER WITH FRIENDS OR RELATIVES?: ONCE A WEEK

## 2024-05-15 ENCOUNTER — OFFICE VISIT (OUTPATIENT)
Dept: FAMILY MEDICINE | Facility: CLINIC | Age: 64
End: 2024-05-15
Attending: FAMILY MEDICINE
Payer: COMMERCIAL

## 2024-05-15 VITALS
HEIGHT: 71 IN | HEART RATE: 89 BPM | BODY MASS INDEX: 26.88 KG/M2 | TEMPERATURE: 98.1 F | DIASTOLIC BLOOD PRESSURE: 78 MMHG | RESPIRATION RATE: 16 BRPM | SYSTOLIC BLOOD PRESSURE: 124 MMHG | OXYGEN SATURATION: 98 % | WEIGHT: 192 LBS

## 2024-05-15 DIAGNOSIS — Z12.11 SCREEN FOR COLON CANCER: ICD-10-CM

## 2024-05-15 DIAGNOSIS — K44.9 HIATAL HERNIA: ICD-10-CM

## 2024-05-15 DIAGNOSIS — K76.0 NAFLD (NONALCOHOLIC FATTY LIVER DISEASE): ICD-10-CM

## 2024-05-15 DIAGNOSIS — Z51.81 MEDICATION MONITORING ENCOUNTER: ICD-10-CM

## 2024-05-15 DIAGNOSIS — Z00.00 ROUTINE GENERAL MEDICAL EXAMINATION AT A HEALTH CARE FACILITY: Primary | ICD-10-CM

## 2024-05-15 DIAGNOSIS — N52.9 ERECTILE DYSFUNCTION, UNSPECIFIED ERECTILE DYSFUNCTION TYPE: ICD-10-CM

## 2024-05-15 DIAGNOSIS — Z12.5 SCREENING FOR PROSTATE CANCER: ICD-10-CM

## 2024-05-15 DIAGNOSIS — E78.5 HYPERLIPIDEMIA LDL GOAL <130: ICD-10-CM

## 2024-05-15 DIAGNOSIS — Z85.828 HISTORY OF SKIN CANCER: ICD-10-CM

## 2024-05-15 DIAGNOSIS — I49.9 CARDIAC ARRHYTHMIA, UNSPECIFIED CARDIAC ARRHYTHMIA TYPE: ICD-10-CM

## 2024-05-15 DIAGNOSIS — F41.9 ANXIETY: ICD-10-CM

## 2024-05-15 DIAGNOSIS — K21.9 GASTROESOPHAGEAL REFLUX DISEASE WITHOUT ESOPHAGITIS: ICD-10-CM

## 2024-05-15 DIAGNOSIS — F33.0 MILD EPISODE OF RECURRENT MAJOR DEPRESSIVE DISORDER (H): ICD-10-CM

## 2024-05-15 DIAGNOSIS — G47.33 OSA (OBSTRUCTIVE SLEEP APNEA): ICD-10-CM

## 2024-05-15 LAB
ALBUMIN UR-MCNC: NEGATIVE MG/DL
APPEARANCE UR: CLEAR
BILIRUB UR QL STRIP: NEGATIVE
COLOR UR AUTO: YELLOW
ERYTHROCYTE [DISTWIDTH] IN BLOOD BY AUTOMATED COUNT: 13.3 % (ref 10–15)
GLUCOSE UR STRIP-MCNC: NEGATIVE MG/DL
HCT VFR BLD AUTO: 44.3 % (ref 40–53)
HGB BLD-MCNC: 14.9 G/DL (ref 13.3–17.7)
HGB UR QL STRIP: NEGATIVE
KETONES UR STRIP-MCNC: NEGATIVE MG/DL
LEUKOCYTE ESTERASE UR QL STRIP: NEGATIVE
MCH RBC QN AUTO: 28.3 PG (ref 26.5–33)
MCHC RBC AUTO-ENTMCNC: 33.6 G/DL (ref 31.5–36.5)
MCV RBC AUTO: 84 FL (ref 78–100)
NITRATE UR QL: NEGATIVE
PH UR STRIP: 7 [PH] (ref 5–7)
PLATELET # BLD AUTO: 254 10E3/UL (ref 150–450)
RBC # BLD AUTO: 5.26 10E6/UL (ref 4.4–5.9)
SP GR UR STRIP: 1.02 (ref 1–1.03)
UROBILINOGEN UR STRIP-ACNC: 0.2 E.U./DL
WBC # BLD AUTO: 7.9 10E3/UL (ref 4–11)

## 2024-05-15 PROCEDURE — 85027 COMPLETE CBC AUTOMATED: CPT | Performed by: FAMILY MEDICINE

## 2024-05-15 PROCEDURE — 99396 PREV VISIT EST AGE 40-64: CPT | Performed by: FAMILY MEDICINE

## 2024-05-15 PROCEDURE — 82043 UR ALBUMIN QUANTITATIVE: CPT | Performed by: FAMILY MEDICINE

## 2024-05-15 PROCEDURE — 99214 OFFICE O/P EST MOD 30 MIN: CPT | Mod: 25 | Performed by: FAMILY MEDICINE

## 2024-05-15 PROCEDURE — 80053 COMPREHEN METABOLIC PANEL: CPT | Performed by: FAMILY MEDICINE

## 2024-05-15 PROCEDURE — 84443 ASSAY THYROID STIM HORMONE: CPT | Performed by: FAMILY MEDICINE

## 2024-05-15 PROCEDURE — 82570 ASSAY OF URINE CREATININE: CPT | Performed by: FAMILY MEDICINE

## 2024-05-15 PROCEDURE — 80061 LIPID PANEL: CPT | Performed by: FAMILY MEDICINE

## 2024-05-15 PROCEDURE — 82550 ASSAY OF CK (CPK): CPT | Performed by: FAMILY MEDICINE

## 2024-05-15 PROCEDURE — 96127 BRIEF EMOTIONAL/BEHAV ASSMT: CPT | Performed by: FAMILY MEDICINE

## 2024-05-15 PROCEDURE — G0103 PSA SCREENING: HCPCS | Performed by: FAMILY MEDICINE

## 2024-05-15 PROCEDURE — 36415 COLL VENOUS BLD VENIPUNCTURE: CPT | Performed by: FAMILY MEDICINE

## 2024-05-15 PROCEDURE — 81003 URINALYSIS AUTO W/O SCOPE: CPT | Performed by: FAMILY MEDICINE

## 2024-05-15 RX ORDER — ROSUVASTATIN CALCIUM 40 MG/1
40 TABLET, COATED ORAL DAILY
Qty: 90 TABLET | Refills: 3 | Status: SHIPPED | OUTPATIENT
Start: 2024-05-15

## 2024-05-15 RX ORDER — SILDENAFIL 50 MG/1
50 TABLET, FILM COATED ORAL DAILY PRN
Qty: 30 TABLET | Refills: 3 | Status: SHIPPED | OUTPATIENT
Start: 2024-05-15

## 2024-05-15 ASSESSMENT — PATIENT HEALTH QUESTIONNAIRE - PHQ9
SUM OF ALL RESPONSES TO PHQ QUESTIONS 1-9: 5
5. POOR APPETITE OR OVEREATING: SEVERAL DAYS

## 2024-05-15 ASSESSMENT — ANXIETY QUESTIONNAIRES
1. FEELING NERVOUS, ANXIOUS, OR ON EDGE: SEVERAL DAYS
5. BEING SO RESTLESS THAT IT IS HARD TO SIT STILL: NOT AT ALL
3. WORRYING TOO MUCH ABOUT DIFFERENT THINGS: NOT AT ALL
6. BECOMING EASILY ANNOYED OR IRRITABLE: SEVERAL DAYS
GAD7 TOTAL SCORE: 4
7. FEELING AFRAID AS IF SOMETHING AWFUL MIGHT HAPPEN: NOT AT ALL
IF YOU CHECKED OFF ANY PROBLEMS ON THIS QUESTIONNAIRE, HOW DIFFICULT HAVE THESE PROBLEMS MADE IT FOR YOU TO DO YOUR WORK, TAKE CARE OF THINGS AT HOME, OR GET ALONG WITH OTHER PEOPLE: NOT DIFFICULT AT ALL
2. NOT BEING ABLE TO STOP OR CONTROL WORRYING: SEVERAL DAYS
GAD7 TOTAL SCORE: 4

## 2024-05-15 NOTE — LETTER
June 7, 2024      Maximino Plasenciavinay  [id number]  [facility] [address]        Dear Mr.Van Ray,    We are writing to inform you of your test results.    -FIT test (screening test for colon cancer) was normal.     We advise:     FIT annually   Colonoscopy due in October, 2027.     Let us know if you have any questions or concerns.     Resulted Orders   Comprehensive metabolic panel   Result Value Ref Range    Sodium 138 135 - 145 mmol/L      Comment:      Reference intervals for this test were updated on 09/26/2023 to more accurately reflect our healthy population. There may be differences in the flagging of prior results with similar values performed with this method. Interpretation of those prior results can be made in the context of the updated reference intervals.     Potassium 4.5 3.4 - 5.3 mmol/L    Carbon Dioxide (CO2) 24 22 - 29 mmol/L    Anion Gap 13 7 - 15 mmol/L    Urea Nitrogen 12.3 8.0 - 23.0 mg/dL    Creatinine 0.91 0.67 - 1.17 mg/dL    GFR Estimate >90 >60 mL/min/1.73m2    Calcium 9.7 8.8 - 10.2 mg/dL    Chloride 101 98 - 107 mmol/L    Glucose 87 70 - 99 mg/dL    Alkaline Phosphatase 63 40 - 150 U/L      Comment:      Reference intervals for this test were updated on 11/14/2023 to more accurately reflect our healthy population. There may be differences in the flagging of prior results with similar values performed with this method. Interpretation of those prior results can be made in the context of the updated reference intervals.    AST 27 0 - 45 U/L      Comment:      Reference intervals for this test were updated on 6/12/2023 to more accurately reflect our healthy population. There may be differences in the flagging of prior results with similar values performed with this method. Interpretation of those prior results can be made in the context of the updated reference intervals.    ALT 35 0 - 70 U/L      Comment:      Reference intervals for this test were updated on 6/12/2023 to more  accurately reflect our healthy population. There may be differences in the flagging of prior results with similar values performed with this method. Interpretation of those prior results can be made in the context of the updated reference intervals.      Protein Total 7.4 6.4 - 8.3 g/dL    Albumin 4.6 3.5 - 5.2 g/dL    Bilirubin Total 0.7 <=1.2 mg/dL    Patient Fasting > 8hrs? Yes    Lipid panel reflex to direct LDL Fasting   Result Value Ref Range    Cholesterol 144 <200 mg/dL    Triglycerides 177 (H) <150 mg/dL    Direct Measure HDL 41 >=40 mg/dL    LDL Cholesterol Calculated 68 <=100 mg/dL    Non HDL Cholesterol 103 <130 mg/dL    Patient Fasting > 8hrs? Yes     Narrative    Cholesterol  Desirable:  <200 mg/dL    Triglycerides  Normal:  Less than 150 mg/dL  Borderline High:  150-199 mg/dL  High:  200-499 mg/dL  Very High:  Greater than or equal to 500 mg/dL    Direct Measure HDL  Female:  Greater than or equal to 50 mg/dL   Male:  Greater than or equal to 40 mg/dL    LDL Cholesterol  Desirable:  <100mg/dL  Above Desirable:  100-129 mg/dL   Borderline High:  130-159 mg/dL   High:  160-189 mg/dL   Very High:  >= 190 mg/dL    Non HDL Cholesterol  Desirable:  130 mg/dL  Above Desirable:  130-159 mg/dL  Borderline High:  160-189 mg/dL  High:  190-219 mg/dL  Very High:  Greater than or equal to 220 mg/dL   CBC with platelets   Result Value Ref Range    WBC Count 7.9 4.0 - 11.0 10e3/uL    RBC Count 5.26 4.40 - 5.90 10e6/uL    Hemoglobin 14.9 13.3 - 17.7 g/dL    Hematocrit 44.3 40.0 - 53.0 %    MCV 84 78 - 100 fL    MCH 28.3 26.5 - 33.0 pg    MCHC 33.6 31.5 - 36.5 g/dL    RDW 13.3 10.0 - 15.0 %    Platelet Count 254 150 - 450 10e3/uL   CK total   Result Value Ref Range     39 - 308 U/L   UA Macroscopic with reflex to Microscopic and Culture   Result Value Ref Range    Color Urine Yellow Colorless, Straw, Light Yellow, Yellow    Appearance Urine Clear Clear    Glucose Urine Negative Negative mg/dL    Bilirubin Urine  Negative Negative    Ketones Urine Negative Negative mg/dL    Specific Gravity Urine 1.020 1.003 - 1.035    Blood Urine Negative Negative    pH Urine 7.0 5.0 - 7.0    Protein Albumin Urine Negative Negative mg/dL    Urobilinogen Urine 0.2 0.2, 1.0 E.U./dL    Nitrite Urine Negative Negative    Leukocyte Esterase Urine Negative Negative    Narrative    Microscopic not indicated   Albumin Random Urine Quantitative with Creat Ratio   Result Value Ref Range    Creatinine Urine mg/dL 189.0 mg/dL      Comment:      The reference ranges have not been established in urine creatinine. The results should be integrated into the clinical context for interpretation.    Albumin Urine mg/L <12.0 mg/L      Comment:      The reference ranges have not been established in urine albumin. The results should be integrated into the clinical context for interpretation.    Albumin Urine mg/g Cr        Comment:      Unable to calculate, urine albumin and/or urine creatinine is outside detectable limits.  Microalbuminuria is defined as an albumin:creatinine ratio of 17 to 299 for males and 25 to 299 for females. A ratio of albumin:creatinine of 300 or higher is indicative of overt proteinuria.  Due to biologic variability, positive results should be confirmed by a second, first-morning random or 24-hour timed urine specimen. If there is discrepancy, a third specimen is recommended. When 2 out of 3 results are in the microalbuminuria range, this is evidence for incipient nephropathy and warrants increased efforts at glucose control, blood pressure control, and institution of therapy with an angiotensin-converting-enzyme (ACE) inhibitor (if the patient can tolerate it).     TSH with free T4 reflex   Result Value Ref Range    TSH 3.12 0.30 - 4.20 uIU/mL   Prostate Specific Antigen Screen   Result Value Ref Range    Prostate Specific Antigen Screen 1.29 0.00 - 4.50 ng/mL    Narrative    This result is obtained using the Roche Elecsys total PSA  method on the wil [id number] immunoassay analyzer. Results obtained with different assay methods or kits cannot be used interchangeably.   Fecal colorectal cancer screen FIT   Result Value Ref Range    Occult Blood Screen FIT Negative Negative       If you have any questions or concerns, please call the clinic at the number listed above.       Sincerely,            Lucas Capellan M.D.

## 2024-05-15 NOTE — PROGRESS NOTES
Preventive Care Visit  [facility] [place]  Lucas Capellan MD, Family Medicine  May 15, 2024      Assessment & Plan     Routine general medical examination at a health care facility    - Comprehensive metabolic panel  - Lipid panel reflex to direct LDL Fasting  - CBC with platelets  - CK total  - UA Macroscopic with reflex to Microscopic and Culture  - Albumin Random Urine Quantitative with Creat Ratio  - TSH with free T4 reflex  - Prostate Specific Antigen Screen  - Fecal colorectal cancer screen FIT  - PRIMARY CARE FOLLOW-UP SCHEDULING  - REVIEW OF HEALTH MAINTENANCE PROTOCOL ORDERS  - Comprehensive metabolic panel  - Lipid panel reflex to direct LDL Fasting  - CBC with platelets  - CK total  - UA Macroscopic with reflex to Microscopic and Culture  - Albumin Random Urine Quantitative with Creat Ratio  - TSH with free T4 reflex  - Prostate Specific Antigen Screen  - Fecal colorectal cancer screen FIT    NAFLD (nonalcoholic fatty liver disease)    - Comprehensive metabolic panel  - UA Macroscopic with reflex to Microscopic and Culture  - Albumin Random Urine Quantitative with Creat Ratio  - PRIMARY CARE FOLLOW-UP SCHEDULING  - REVIEW OF HEALTH MAINTENANCE PROTOCOL ORDERS  - Comprehensive metabolic panel  - UA Macroscopic with reflex to Microscopic and Culture  - Albumin Random Urine Quantitative with Creat Ratio  - OFFICE/OUTPT VISIT,EST,LEVL IV    Hyperlipidemia LDL goal <130    - Comprehensive metabolic panel  - Lipid panel reflex to direct LDL Fasting  - CK total  - PRIMARY CARE FOLLOW-UP SCHEDULING  - REVIEW OF HEALTH MAINTENANCE PROTOCOL ORDERS  - Comprehensive metabolic panel  - Lipid panel reflex to direct LDL Fasting  - CK total  - rosuvastatin (CRESTOR) 40 MG tablet  Dispense: 90 tablet; Refill: 3  - OFFICE/OUTPT VISIT,EST,LEVL IV    MAYITO (obstructive sleep apnea)    - TSH with free T4 reflex  - PRIMARY CARE FOLLOW-UP SCHEDULING  - REVIEW OF HEALTH MAINTENANCE PROTOCOL ORDERS  - TSH with free T4  reflex  - Adult Sleep Eval & Management  Referral  - OFFICE/OUTPT VISIT,EST,LEVL IV    Mild episode of recurrent major depressive disorder (H24)    - TSH with free T4 reflex  - PRIMARY CARE FOLLOW-UP SCHEDULING  - Adult Mental Health  Referral  - OFFICE/OUTPT VISIT,EST,LEVL IV    Anxiety    - TSH with free T4 reflex  - PRIMARY CARE FOLLOW-UP SCHEDULING  - Adult Mental Health  Referral  - OFFICE/OUTPT VISIT,EST,LEVL IV    Cardiac arrhythmia, unspecified cardiac arrhythmia type    - Comprehensive metabolic panel  - CBC with platelets  - TSH with free T4 reflex  - PRIMARY CARE FOLLOW-UP SCHEDULING  - REVIEW OF HEALTH MAINTENANCE PROTOCOL ORDERS  - Comprehensive metabolic panel  - CBC with platelets  - TSH with free T4 reflex  - OFFICE/OUTPT VISIT,EST,LEVL IV    Gastroesophageal reflux disease without esophagitis    - CBC with platelets  - PRIMARY CARE FOLLOW-UP SCHEDULING  - REVIEW OF HEALTH MAINTENANCE PROTOCOL ORDERS  - CBC with platelets  - esomeprazole (NEXIUM) 20 MG DR capsule  Dispense: 90 capsule; Refill: 3  - OFFICE/OUTPT VISIT,EST,LEVL IV    Hiatal hernia    - CBC with platelets  - PRIMARY CARE FOLLOW-UP SCHEDULING  - REVIEW OF HEALTH MAINTENANCE PROTOCOL ORDERS  - CBC with platelets  - esomeprazole (NEXIUM) 20 MG DR capsule  Dispense: 90 capsule; Refill: 3  - OFFICE/OUTPT VISIT,EST,LEVL IV    History of skin cancer    - PRIMARY CARE FOLLOW-UP SCHEDULING  - REVIEW OF HEALTH MAINTENANCE PROTOCOL ORDERS  - OFFICE/OUTPT VISIT,EST,LEVL IV    Erectile dysfunction, unspecified erectile dysfunction type    - PRIMARY CARE FOLLOW-UP SCHEDULING  - REVIEW OF HEALTH MAINTENANCE PROTOCOL ORDERS  - sildenafil (VIAGRA) 50 MG tablet  Dispense: 30 tablet; Refill: 3  - OFFICE/OUTPT VISIT,EST,LEVL IV    Screening for prostate cancer    - Prostate Specific Antigen Screen  - PRIMARY CARE FOLLOW-UP SCHEDULING  - REVIEW OF HEALTH MAINTENANCE PROTOCOL ORDERS  - Prostate Specific Antigen Screen  -  "OFFICE/OUTPT VISIT,EST,LEVL IV    Screen for colon cancer    - Fecal colorectal cancer screen FIT  - PRIMARY CARE FOLLOW-UP SCHEDULING  - REVIEW OF HEALTH MAINTENANCE PROTOCOL ORDERS  - Fecal colorectal cancer screen FIT  - OFFICE/OUTPT VISIT,EST,LEVL IV    Medication monitoring encounter    - Comprehensive metabolic panel  - Lipid panel reflex to direct LDL Fasting  - CBC with platelets  - CK total  - UA Macroscopic with reflex to Microscopic and Culture  - Albumin Random Urine Quantitative with Creat Ratio  - TSH with free T4 reflex  - PRIMARY CARE FOLLOW-UP SCHEDULING  - REVIEW OF HEALTH MAINTENANCE PROTOCOL ORDERS  - Comprehensive metabolic panel  - Lipid panel reflex to direct LDL Fasting  - CBC with platelets  - CK total  - UA Macroscopic with reflex to Microscopic and Culture  - Albumin Random Urine Quantitative with Creat Ratio  - TSH with free T4 reflex  - OFFICE/OUTPT VISIT,EST,LEVL IV    Patient has been advised of split billing requirements and indicates understanding: Yes      BMI  Estimated body mass index is 26.78 kg/m  as calculated from the following:    Height as of this encounter: 1.803 m (5' 11\").    Weight as of this encounter: 87.1 kg (192 lb).       Counseling  Appropriate preventive services were discussed with this patient, including applicable screening as appropriate for fall prevention, nutrition, physical activity, Tobacco-use cessation, weight loss and cognition.  Checklist reviewing preventive services available has been given to the patient.  Reviewed patient's diet, addressing concerns and/or questions.   He is at risk for lack of exercise and has been provided with information to increase physical activity for the benefit of his well-being.       Regular exercise    Plan:    1) Medications: refills done    2) Labs: pending    3) Immunizations: reviewed, get info, twinrix & shingrix, advised covid/flu/prevnar 20/RSV    4) Imaging/Diagnostics: AAA screening 2025    5) Consults: mental " health, consider meds    6) follow up spine clinic prn    Time: 35 minutes    Subjective   Maximino is a 64 year old, presenting for the following:  Physical        5/15/2024     3:05 PM   Additional Questions   Roomed by Annamarie LECHUGA CMA        Health Care Directive  Patient does not have a Health Care Directive or Living Will: Discussed advance care planning with patient; however, patient declined at this time.    NAFLD    Anxiety - increasing ? - work related - Sister taking prozac        5/15/2024     4:24 PM   PHQ   PHQ-9 Total Score 5   Q9: Thoughts of better off dead/self-harm past 2 weeks Not at all           5/15/2024     4:24 PM   ZAK-7 SCORE   Total Score 4     Hyperlipidemia Follow-Up    Are you regularly taking any medication or supplement to lower your cholesterol?   Yes- rosuvastatin 40mg  Are you having muscle aches or other side effects that you think could be caused by your cholesterol lowering medication?  No    Recent Labs   Lab Test 05/15/23  1419 02/24/23  0720   CHOL 154 130   HDL 45 40   LDL 77 64   TRIG 160* 131     MAYITO - Using every night  - CPAP - new [facility]    Cardiac Arrhythmia - benign, PVC's, negative work up    GERD / Hiatal hernia - nexium controls with gaviscon BID    Hx of skin cancer - Dermatology        5/14/2024   General Health   How would you rate your overall physical health? Good   Feel stress (tense, anxious, or unable to sleep) To some extent   (!) STRESS CONCERN      5/14/2024   Nutrition   Three or more servings of calcium each day? Yes   Diet: Regular (no restrictions)    Low salt    Low fat/cholesterol   How many servings of fruit and vegetables per day? (!) 2-3   How many sweetened beverages each day? 0-1         5/14/2024   Exercise   Days per week of moderate/strenous exercise 3 days   Average minutes spent exercising at this level 90 min         5/14/2024   Social Factors   Frequency of gathering with friends or relatives Once a week   Worry food won't last until get money  to buy more No   Food not last or not have enough money for food? No   Do you have housing?  Yes   Are you worried about losing your housing? No   Lack of transportation? No   Unable to get utilities (heat,electricity)? No         5/14/2024   Fall Risk   Fallen 2 or more times in the past year? No   Trouble with walking or balance? No          5/14/2024   Dental   Dentist two times every year? Yes         5/14/2024   TB Screening   Were you born outside of the [place]? No         Today's PHQ-2 Score:       5/14/2024     4:33 PM   PHQ-2 ( 1999 [employer])   Q1: Little interest or pleasure in doing things 0   Q2: Feeling down, depressed or hopeless 0   PHQ-2 Score 0   Q1: Little interest or pleasure in doing things Not at all   Q2: Feeling down, depressed or hopeless Not at all   PHQ-2 Score 0           5/14/2024   Substance Use   Alcohol more than 3/day or more than 7/wk No   Do you use any other substances recreationally? (!) ALCOHOL    No Opioids     Social History     Tobacco Use    Smoking status: Former     Types: Cigarettes    Smokeless tobacco: Former     Types: Chew    Tobacco comments:     Smoked in high schools for a few years - 0.75 ppd x 2 yrs     Quit chew in 2014   Vaping Use    Vaping status: Never Used   Substance Use Topics    Alcohol use: Yes     Alcohol/week: 1.0 - 2.0 standard drink of alcohol     Types: 1 - 2 Standard drinks or equivalent per week     Comment: 1-2 per week    Drug use: Never           5/14/2024   STI Screening   New sexual partner(s) since last STI/HIV test? No   Last PSA:   PSA   Date Value Ref Range Status   03/05/2021 0.84 0 - 4 ug/L Final     Comment:     Assay Method:  Chemiluminescence using Siemens Vista analyzer     Prostate Specific Antigen Screen   Date Value Ref Range Status   05/15/2023 1.20 0.00 - 4.50 ng/mL Final   10/04/2022 1.12 0.00 - 4.00 ug/L Final     ASCVD Risk   The 10-year ASCVD risk score (Nicky PAGAN, et al., 2019) is: 9.1%    Values used to calculate the  score:      Age: 64 years      Sex: Male      Is Non- : No      Diabetic: No      Tobacco smoker: No      Systolic Blood Pressure: 124 mmHg      Is BP treated: No      HDL Cholesterol: 45 mg/dL      Total Cholesterol: 154 mg/dL    Fracture Risk Assessment Tool  Link to Frax Calculator  Use the information below to complete the Frax calculator  : 1960  Sex: male  Weight (kg): 87.1 kg (actual weight)  Height (cm): 180.3 cm  Previous Fragility Fracture:  No  History of parent with fractured hip:  No  Current Smoking:  No  Patient has been on glucocorticoids for more than 3 months (5mg/day or more): No  Rheumatoid Arthritis on Problem List:  No  Secondary Osteoporosis on Problem List:  No  Consumes 3 or more units of alcohol per day: No  Femoral Neck BMD (g/cm2)           Reviewed and updated as needed this visit by Provider                    Patient Active Problem List   Diagnosis    Hyperlipidemia LDL goal <130    History of skin cancer    Cardiac arrhythmia, unspecified cardiac arrhythmia type    NAFLD (nonalcoholic fatty liver disease)    Hiatal hernia    Gastroesophageal reflux disease without esophagitis       Past Medical History:   Diagnosis Date    Cardiac arrhythmia, unspecified cardiac arrhythmia type     benign, extensive work up    Gastroesophageal reflux disease without esophagitis     Hiatal hernia     History of skin cancer     SCC, BCC    Hyperlipidemia LDL goal <130     Mild intermittent asthma without complication     resolved    NAFLD (nonalcoholic fatty liver disease)     Metabolic and steatoic liver disease -  Hepatology - Dr Ritter    MAYITO (obstructive sleep apnea)     CPAP - [facility]       Past Surgical History:   Procedure Laterality Date    COLONOSCOPY N/A 10/17/2022    Procedure: COLONOSCOPY;  Surgeon: Oneil Cabral MD;  Location:  GI    ESOPHAGOSCOPY, GASTROSCOPY, DUODENOSCOPY (EGD), COMBINED N/A 10/17/2022    Procedure:  ESOPHAGOGASTRODUODENOSCOPY (EGD);  Surgeon: Oneil Cabral MD;  Location:  GI    SURGICAL HISTORY OF -  Right     x 2, 2000, 2005, arthroscopy, ACL reconstruction    SURGICAL HISTORY OF -   2017    Deviated septum/soft palpate repair       Current Outpatient Medications   Medication Sig Dispense Refill    aspirin (ASA) 81 MG chewable tablet Take 81 mg by mouth daily      esomeprazole (NEXIUM) 20 MG DR capsule Take 1 capsule (20 mg) by mouth every morning (before breakfast) Take 30-60 minutes before eating. 90 capsule 3    rosuvastatin (CRESTOR) 40 MG tablet Take 1 tablet (40 mg) by mouth daily 90 tablet 3    sildenafil (VIAGRA) 50 MG tablet Take 1 tablet (50 mg) by mouth daily as needed (30-60 minutes before intercourse) 30 tablet 3       Allergies   Allergen Reactions    Morphine Nausea and Vomiting       Family History   Problem Relation Age of Onset    Colon Cancer Mother 68    Rectal Cancer Mother     Abdominal Aortic Aneurysm Father     Bladder Cancer Father 75    Gallbladder Disease Father     Prostate Cancer Father     Arrhythmia Sister     Heart Failure Maternal Grandmother     Lung Cancer Maternal Grandfather         smoker/ship     Heart Failure Paternal Grandmother     Alcoholism Paternal Grandfather     Obesity Paternal Grandfather     Abdominal Aortic Aneurysm Paternal Uncle        Social History     Socioeconomic History    Marital status:      Spouse name: Rand    Number of children: 3    Years of education: None    Highest education level: None   Tobacco Use    Smoking status: Never    Smokeless tobacco: Former     Types: Chew    Tobacco comments:     Smoked in high schools for a few years     Quit chew in 2014   Vaping Use    Vaping status: Never Used   Substance and Sexual Activity    Alcohol use: Yes     Alcohol/week: 1.0 - 2.0 standard drink of alcohol     Types: 1 - 2 Standard drinks or equivalent per week     Comment: 1-2 per week    Drug use: Not Currently    Sexual  activity: Yes     Social Determinants of Health     Financial Resource Strain: Low Risk  (5/14/2024)    Financial Resource Strain     Within the past 12 months, have you or your family members you live with been unable to get utilities (heat, electricity) when it was really needed?: No   Food Insecurity: Low Risk  (5/14/2024)    Food Insecurity     Within the past 12 months, did you worry that your food would run out before you got money to buy more?: No     Within the past 12 months, did the food you bought just not last and you didn t have money to get more?: No   Transportation Needs: Low Risk  (5/14/2024)    Transportation Needs     Within the past 12 months, has lack of transportation kept you from medical appointments, getting your medicines, non-medical meetings or appointments, work, or from getting things that you need?: No   Physical Activity: Sufficiently Active (5/14/2024)    Exercise Vital Sign     Days of Exercise per Week: 3 days     Minutes of Exercise per Session: 90 min   Stress: Stress Concern Present (5/14/2024)    [place] [employer] - Occupational Stress Questionnaire     Feeling of Stress : To some extent   Social Connections: Unknown (5/14/2024)    Social Connection and Isolation Panel [NHANES]     Frequency of Social Gatherings with Friends and Family: Once a week   Interpersonal Safety: Low Risk  (5/15/2024)    Interpersonal Safety     Do you feel physically and emotionally safe where you currently live?: Yes     Within the past 12 months, have you been hit, slapped, kicked or otherwise physically hurt by someone?: No     Within the past 12 months, have you been humiliated or emotionally abused in other ways by your partner or ex-partner?: No   Housing Stability: Low Risk  (5/14/2024)    Housing Stability     Do you have housing? : Yes     Are you worried about losing your housing?: No     Colonoscopy:  last 10/2022, due 5 yrs  FIT / Cologuard: pending  PSA:  "pending      Review of Systems  CONSTITUTIONAL: NEGATIVE for fever, chills, change in weight  INTEGUMENTARY/SKIN: NEGATIVE for worrisome rashes, moles or lesions  EYES: NEGATIVE for vision changes or irritation  ENT/MOUTH: NEGATIVE for ear, mouth and throat problems  RESP: NEGATIVE for significant cough or SOB  CV: NEGATIVE for chest pain, palpitations or peripheral edema  GI: NEGATIVE for nausea, abdominal pain, heartburn, or change in bowel habits  : NEGATIVE for frequency, dysuria, or hematuria  MUSCULOSKELETAL: NEGATIVE for significant arthralgias or myalgia  NEURO: NEGATIVE for weakness, dizziness or paresthesias  ENDOCRINE: NEGATIVE for temperature intolerance, skin/hair changes  HEME: NEGATIVE for bleeding problems  PSYCHIATRIC: NEGATIVE for changes in mood or affect     Objective    Exam  /78   Pulse 89   Temp 98.1  F (36.7  C) (Tympanic)   Resp 16   Ht 1.803 m (5' 11\")   Wt 87.1 kg (192 lb)   SpO2 98%   BMI 26.78 kg/m     Estimated body mass index is 26.78 kg/m  as calculated from the following:    Height as of this encounter: 1.803 m (5' 11\").    Weight as of this encounter: 87.1 kg (192 lb).    Physical Exam  GENERAL: alert and no distress  EYES: Eyes grossly normal to inspection, PERRL and conjunctivae and sclerae normal  HENT: ear canals and TM's normal, nose and mouth without ulcers or lesions  NECK: no adenopathy, no asymmetry, masses, or scars  RESP: lungs clear to auscultation - no rales, rhonchi or wheezes  CV: regular rate and rhythm, normal S1 S2, no S3 or S4, no murmur, click or rub, no peripheral edema  ABDOMEN: soft, nontender, no hepatosplenomegaly, no masses and bowel sounds normal   (male): pt declines  RECTAL: pt declines  MS: no gross musculoskeletal defects noted, no edema  SKIN: no suspicious lesions or rashes  NEURO: Normal strength and tone, mentation intact and speech normal  PSYCH: mentation appears normal, affect normal/bright    Signed Electronically by:          "     Lucas Capellan MD, FAAFP     [facility]  [address] [place] [id number]  [email or web address]   Office: [phone]  Fax: [phone]

## 2024-05-17 LAB
ALBUMIN SERPL BCG-MCNC: 4.6 G/DL (ref 3.5–5.2)
ALP SERPL-CCNC: 63 U/L (ref 40–150)
ALT SERPL W P-5'-P-CCNC: 35 U/L (ref 0–70)
ANION GAP SERPL CALCULATED.3IONS-SCNC: 13 MMOL/L (ref 7–15)
AST SERPL W P-5'-P-CCNC: 27 U/L (ref 0–45)
BILIRUB SERPL-MCNC: 0.7 MG/DL
BUN SERPL-MCNC: 12.3 MG/DL (ref 8–23)
CALCIUM SERPL-MCNC: 9.7 MG/DL (ref 8.8–10.2)
CHLORIDE SERPL-SCNC: 101 MMOL/L (ref 98–107)
CHOLEST SERPL-MCNC: 144 MG/DL
CK SERPL-CCNC: 134 U/L (ref 39–308)
CREAT SERPL-MCNC: 0.91 MG/DL (ref 0.67–1.17)
CREAT UR-MCNC: 189 MG/DL
DEPRECATED HCO3 PLAS-SCNC: 24 MMOL/L (ref 22–29)
EGFRCR SERPLBLD CKD-EPI 2021: >90 ML/MIN/1.73M2
FASTING STATUS PATIENT QL REPORTED: YES
FASTING STATUS PATIENT QL REPORTED: YES
GLUCOSE SERPL-MCNC: 87 MG/DL (ref 70–99)
HDLC SERPL-MCNC: 41 MG/DL
LDLC SERPL CALC-MCNC: 68 MG/DL
MICROALBUMIN UR-MCNC: <12 MG/L
MICROALBUMIN/CREAT UR: NORMAL MG/G{CREAT}
NONHDLC SERPL-MCNC: 103 MG/DL
POTASSIUM SERPL-SCNC: 4.5 MMOL/L (ref 3.4–5.3)
PROT SERPL-MCNC: 7.4 G/DL (ref 6.4–8.3)
PSA SERPL DL<=0.01 NG/ML-MCNC: 1.29 NG/ML (ref 0–4.5)
SODIUM SERPL-SCNC: 138 MMOL/L (ref 135–145)
TRIGL SERPL-MCNC: 177 MG/DL
TSH SERPL DL<=0.005 MIU/L-ACNC: 3.12 UIU/ML (ref 0.3–4.2)

## 2024-06-03 PROCEDURE — 82274 ASSAY TEST FOR BLOOD FECAL: CPT | Performed by: FAMILY MEDICINE

## 2024-06-06 LAB — HEMOCCULT STL QL IA: NEGATIVE

## 2025-04-12 NOTE — TELEPHONE ENCOUNTER
Completed forms faxed back to [employer] at [phone].   Originals sent to be scanned.       Ade Alcocer       
Reason for Call:  Form, our goal is to have forms completed with 72 hours, however, some forms may require a visit or additional information.    Type of letter, form or note:  Health Screening     Who is the form from?: [employer]  (if other please explain)    Where did the form come from: Patient or family brought in       What clinic location was the form placed at?: [facility]    Where the form was placed: Given to physician    What number is listed as a contact on the form?: [phone]       Additional comments:     Call taken on 4/14/2022 at 8:56 AM by Selene Alcocer        
Signed given to ODETTE santana.         Stacy Gallegos, FNP-BC  
70

## 2025-05-12 SDOH — HEALTH STABILITY: PHYSICAL HEALTH: ON AVERAGE, HOW MANY DAYS PER WEEK DO YOU ENGAGE IN MODERATE TO STRENUOUS EXERCISE (LIKE A BRISK WALK)?: 3 DAYS

## 2025-05-12 SDOH — HEALTH STABILITY: PHYSICAL HEALTH: ON AVERAGE, HOW MANY MINUTES DO YOU ENGAGE IN EXERCISE AT THIS LEVEL?: 30 MIN

## 2025-05-12 ASSESSMENT — ANXIETY QUESTIONNAIRES
GAD7 TOTAL SCORE: 5
3. WORRYING TOO MUCH ABOUT DIFFERENT THINGS: SEVERAL DAYS
IF YOU CHECKED OFF ANY PROBLEMS ON THIS QUESTIONNAIRE, HOW DIFFICULT HAVE THESE PROBLEMS MADE IT FOR YOU TO DO YOUR WORK, TAKE CARE OF THINGS AT HOME, OR GET ALONG WITH OTHER PEOPLE: NOT DIFFICULT AT ALL
4. TROUBLE RELAXING: SEVERAL DAYS
GAD7 TOTAL SCORE: 5
7. FEELING AFRAID AS IF SOMETHING AWFUL MIGHT HAPPEN: NOT AT ALL
2. NOT BEING ABLE TO STOP OR CONTROL WORRYING: NOT AT ALL
8. IF YOU CHECKED OFF ANY PROBLEMS, HOW DIFFICULT HAVE THESE MADE IT FOR YOU TO DO YOUR WORK, TAKE CARE OF THINGS AT HOME, OR GET ALONG WITH OTHER PEOPLE?: NOT DIFFICULT AT ALL
7. FEELING AFRAID AS IF SOMETHING AWFUL MIGHT HAPPEN: NOT AT ALL
1. FEELING NERVOUS, ANXIOUS, OR ON EDGE: SEVERAL DAYS
GAD7 TOTAL SCORE: 5
6. BECOMING EASILY ANNOYED OR IRRITABLE: SEVERAL DAYS
5. BEING SO RESTLESS THAT IT IS HARD TO SIT STILL: SEVERAL DAYS

## 2025-05-12 ASSESSMENT — SOCIAL DETERMINANTS OF HEALTH (SDOH): HOW OFTEN DO YOU GET TOGETHER WITH FRIENDS OR RELATIVES?: ONCE A WEEK

## 2025-05-14 ASSESSMENT — PATIENT HEALTH QUESTIONNAIRE - PHQ9
SUM OF ALL RESPONSES TO PHQ QUESTIONS 1-9: 3
10. IF YOU CHECKED OFF ANY PROBLEMS, HOW DIFFICULT HAVE THESE PROBLEMS MADE IT FOR YOU TO DO YOUR WORK, TAKE CARE OF THINGS AT HOME, OR GET ALONG WITH OTHER PEOPLE: NOT DIFFICULT AT ALL
SUM OF ALL RESPONSES TO PHQ QUESTIONS 1-9: 3

## 2025-05-15 ENCOUNTER — OFFICE VISIT (OUTPATIENT)
Dept: FAMILY MEDICINE | Facility: CLINIC | Age: 65
End: 2025-05-15
Payer: COMMERCIAL

## 2025-05-15 VITALS
BODY MASS INDEX: 27.77 KG/M2 | OXYGEN SATURATION: 97 % | WEIGHT: 198.4 LBS | SYSTOLIC BLOOD PRESSURE: 128 MMHG | DIASTOLIC BLOOD PRESSURE: 86 MMHG | HEART RATE: 70 BPM | HEIGHT: 71 IN | RESPIRATION RATE: 16 BRPM | TEMPERATURE: 98.1 F

## 2025-05-15 DIAGNOSIS — Z23 NEED FOR HEPATITIS A AND B VACCINATION: ICD-10-CM

## 2025-05-15 DIAGNOSIS — H93.13 TINNITUS, BILATERAL: ICD-10-CM

## 2025-05-15 DIAGNOSIS — Z00.00 ROUTINE GENERAL MEDICAL EXAMINATION AT A HEALTH CARE FACILITY: Primary | ICD-10-CM

## 2025-05-15 DIAGNOSIS — K21.9 GASTROESOPHAGEAL REFLUX DISEASE WITHOUT ESOPHAGITIS: ICD-10-CM

## 2025-05-15 DIAGNOSIS — E78.5 HYPERLIPIDEMIA LDL GOAL <130: ICD-10-CM

## 2025-05-15 DIAGNOSIS — M54.16 LUMBAR RADICULOPATHY: ICD-10-CM

## 2025-05-15 DIAGNOSIS — F41.9 ANXIETY: ICD-10-CM

## 2025-05-15 DIAGNOSIS — Z51.81 MEDICATION MONITORING ENCOUNTER: ICD-10-CM

## 2025-05-15 DIAGNOSIS — K76.0 NAFLD (NONALCOHOLIC FATTY LIVER DISEASE): ICD-10-CM

## 2025-05-15 DIAGNOSIS — N52.9 ERECTILE DYSFUNCTION, UNSPECIFIED ERECTILE DYSFUNCTION TYPE: ICD-10-CM

## 2025-05-15 DIAGNOSIS — K44.9 HIATAL HERNIA: ICD-10-CM

## 2025-05-15 DIAGNOSIS — M48.062 SPINAL STENOSIS OF LUMBAR REGION WITH NEUROGENIC CLAUDICATION: ICD-10-CM

## 2025-05-15 DIAGNOSIS — Z23 NEED FOR SHINGLES VACCINE: ICD-10-CM

## 2025-05-15 DIAGNOSIS — G47.33 OSA (OBSTRUCTIVE SLEEP APNEA): ICD-10-CM

## 2025-05-15 DIAGNOSIS — I49.9 CARDIAC ARRHYTHMIA, UNSPECIFIED CARDIAC ARRHYTHMIA TYPE: ICD-10-CM

## 2025-05-15 DIAGNOSIS — Z12.5 SCREENING FOR PROSTATE CANCER: ICD-10-CM

## 2025-05-15 DIAGNOSIS — R45.4 IRRITABILITY: ICD-10-CM

## 2025-05-15 DIAGNOSIS — Z12.11 SCREEN FOR COLON CANCER: ICD-10-CM

## 2025-05-15 DIAGNOSIS — F33.0 MILD EPISODE OF RECURRENT MAJOR DEPRESSIVE DISORDER: ICD-10-CM

## 2025-05-15 DIAGNOSIS — Z85.828 HISTORY OF SKIN CANCER: ICD-10-CM

## 2025-05-15 LAB
ALBUMIN SERPL BCG-MCNC: 4.4 G/DL (ref 3.5–5.2)
ALBUMIN UR-MCNC: NEGATIVE MG/DL
ALP SERPL-CCNC: 70 U/L (ref 40–150)
ALT SERPL W P-5'-P-CCNC: 36 U/L (ref 0–70)
ANION GAP SERPL CALCULATED.3IONS-SCNC: 12 MMOL/L (ref 7–15)
APPEARANCE UR: CLEAR
AST SERPL W P-5'-P-CCNC: 29 U/L (ref 0–45)
BILIRUB SERPL-MCNC: 0.4 MG/DL
BILIRUB UR QL STRIP: NEGATIVE
BUN SERPL-MCNC: 14.6 MG/DL (ref 8–23)
CALCIUM SERPL-MCNC: 9.7 MG/DL (ref 8.8–10.4)
CHLORIDE SERPL-SCNC: 105 MMOL/L (ref 98–107)
CHOLEST SERPL-MCNC: 165 MG/DL
CK SERPL-CCNC: 139 U/L (ref 39–308)
COLOR UR AUTO: YELLOW
CREAT SERPL-MCNC: 0.83 MG/DL (ref 0.67–1.17)
CREAT UR-MCNC: 108 MG/DL
EGFRCR SERPLBLD CKD-EPI 2021: >90 ML/MIN/1.73M2
ERYTHROCYTE [DISTWIDTH] IN BLOOD BY AUTOMATED COUNT: 13.1 % (ref 10–15)
FASTING STATUS PATIENT QL REPORTED: YES
FASTING STATUS PATIENT QL REPORTED: YES
GLUCOSE SERPL-MCNC: 108 MG/DL (ref 70–99)
GLUCOSE UR STRIP-MCNC: NEGATIVE MG/DL
HCO3 SERPL-SCNC: 24 MMOL/L (ref 22–29)
HCT VFR BLD AUTO: 45.3 % (ref 40–53)
HDLC SERPL-MCNC: 39 MG/DL
HGB BLD-MCNC: 15.3 G/DL (ref 13.3–17.7)
HGB UR QL STRIP: NEGATIVE
KETONES UR STRIP-MCNC: NEGATIVE MG/DL
LDLC SERPL CALC-MCNC: 76 MG/DL
LEUKOCYTE ESTERASE UR QL STRIP: NEGATIVE
MCH RBC QN AUTO: 27.9 PG (ref 26.5–33)
MCHC RBC AUTO-ENTMCNC: 33.8 G/DL (ref 31.5–36.5)
MCV RBC AUTO: 83 FL (ref 78–100)
MICROALBUMIN UR-MCNC: <12 MG/L
MICROALBUMIN/CREAT UR: NORMAL MG/G{CREAT}
NITRATE UR QL: NEGATIVE
NONHDLC SERPL-MCNC: 126 MG/DL
PH UR STRIP: 6.5 [PH] (ref 5–7)
PLATELET # BLD AUTO: 239 10E3/UL (ref 150–450)
POTASSIUM SERPL-SCNC: 4.2 MMOL/L (ref 3.4–5.3)
PROT SERPL-MCNC: 7 G/DL (ref 6.4–8.3)
PSA SERPL DL<=0.01 NG/ML-MCNC: 0.99 NG/ML (ref 0–4.5)
RBC # BLD AUTO: 5.49 10E6/UL (ref 4.4–5.9)
SODIUM SERPL-SCNC: 141 MMOL/L (ref 135–145)
SP GR UR STRIP: 1.02 (ref 1–1.03)
TRIGL SERPL-MCNC: 248 MG/DL
TSH SERPL DL<=0.005 MIU/L-ACNC: 2.87 UIU/ML (ref 0.3–4.2)
UROBILINOGEN UR STRIP-ACNC: 0.2 E.U./DL
WBC # BLD AUTO: 7.4 10E3/UL (ref 4–11)

## 2025-05-15 RX ORDER — ROSUVASTATIN CALCIUM 40 MG/1
40 TABLET, COATED ORAL DAILY
Qty: 90 TABLET | Refills: 3 | Status: SHIPPED | OUTPATIENT
Start: 2025-05-15

## 2025-05-15 RX ORDER — SILDENAFIL 50 MG/1
50 TABLET, FILM COATED ORAL DAILY PRN
Qty: 30 TABLET | Refills: 3 | Status: SHIPPED | OUTPATIENT
Start: 2025-05-15

## 2025-05-15 NOTE — PROGRESS NOTES
Preventive Care Visit  [facility] [place]  Lucas Capellan MD, Family Medicine  May 15, 2025      Assessment & Plan     Routine general medical examination at a health care facility    - Comprehensive metabolic panel  - Lipid panel reflex to direct LDL Fasting  - CBC with platelets  - CK total  - UA Macroscopic with reflex to Microscopic and Culture  - Albumin Random Urine Quantitative with Creat Ratio  - TSH with free T4 reflex  - Prostate Specific Antigen Screen  - Fecal colorectal cancer screen FIT  - REVIEW OF HEALTH MAINTENANCE PROTOCOL ORDERS  - PRIMARY CARE FOLLOW-UP SCHEDULING    Mild episode of recurrent major depressive disorder    - REVIEW OF HEALTH MAINTENANCE PROTOCOL ORDERS  - PRIMARY CARE FOLLOW-UP SCHEDULING  - OFFICE/OUTPT VISIT,ESTLEVL IV    Anxiety    - REVIEW OF HEALTH MAINTENANCE PROTOCOL ORDERS  - PRIMARY CARE FOLLOW-UP SCHEDULING  - OFFICE/OUTPT VISIT,ESTLEVL IV    Irritability    - REVIEW OF HEALTH MAINTENANCE PROTOCOL ORDERS  - Adult Mental Health  Referral  - PRIMARY CARE FOLLOW-UP SCHEDULING  - OFFICE/OUTPT VISIT,ESTLEVL IV    NAFLD (nonalcoholic fatty liver disease)    - Comprehensive metabolic panel  - CBC with platelets  - UA Macroscopic with reflex to Microscopic and Culture  - Albumin Random Urine Quantitative with Creat Ratio  - REVIEW OF HEALTH MAINTENANCE PROTOCOL ORDERS  - PRIMARY CARE FOLLOW-UP SCHEDULING  - OFFICE/OUTPT VISIT,ESTLEVL IV    Hyperlipidemia LDL goal <130    - Comprehensive metabolic panel  - Lipid panel reflex to direct LDL Fasting  - CK total  - REVIEW OF HEALTH MAINTENANCE PROTOCOL ORDERS  - PRIMARY CARE FOLLOW-UP SCHEDULING  - rosuvastatin (CRESTOR) 40 MG tablet  Dispense: 90 tablet; Refill: 3  - OFFICE/OUTPT VISIT,ESTLEVL IV    MAYITO (obstructive sleep apnea)    - REVIEW OF HEALTH MAINTENANCE PROTOCOL ORDERS  - PRIMARY CARE FOLLOW-UP SCHEDULING  - Adult Sleep Eval & Management  Referral  - OFFICE/OUTPT VISIT,EST,LEVL  IV    Lumbar radiculopathy    - REVIEW OF HEALTH MAINTENANCE PROTOCOL ORDERS  - PRIMARY CARE FOLLOW-UP SCHEDULING  - Spine  Referral  - OFFICE/OUTPT VISIT,EST,LEVL IV    Spinal stenosis of lumbar region with neurogenic claudication    - REVIEW OF HEALTH MAINTENANCE PROTOCOL ORDERS  - PRIMARY CARE FOLLOW-UP SCHEDULING  - Spine  Referral  - OFFICE/OUTPT VISIT,EST,LEVL IV    Gastroesophageal reflux disease without esophagitis    - CBC with platelets  - REVIEW OF HEALTH MAINTENANCE PROTOCOL ORDERS  - PRIMARY CARE FOLLOW-UP SCHEDULING  - esomeprazole (NEXIUM) 20 MG DR capsule  Dispense: 90 capsule; Refill: 3  - OFFICE/OUTPT VISIT,EST,LEVL IV    Hiatal hernia    - CBC with platelets  - REVIEW OF HEALTH MAINTENANCE PROTOCOL ORDERS  - PRIMARY CARE FOLLOW-UP SCHEDULING  - esomeprazole (NEXIUM) 20 MG DR capsule  Dispense: 90 capsule; Refill: 3  - OFFICE/OUTPT VISIT,EST,LEVL IV    History of skin cancer    - REVIEW OF HEALTH MAINTENANCE PROTOCOL ORDERS  - PRIMARY CARE FOLLOW-UP SCHEDULING  - Adult Dermatology  Referral  - OFFICE/OUTPT VISIT,EST,LEVL IV    Tinnitus, bilateral    - REVIEW OF HEALTH MAINTENANCE PROTOCOL ORDERS  - PRIMARY CARE FOLLOW-UP SCHEDULING  - Adult ENT  Referral  - OFFICE/OUTPT VISIT,EST,LEVL IV    Cardiac arrhythmia, unspecified cardiac arrhythmia type    - REVIEW OF HEALTH MAINTENANCE PROTOCOL ORDERS  - PRIMARY CARE FOLLOW-UP SCHEDULING  - OFFICE/OUTPT VISIT,EST,LEVL IV    Erectile dysfunction, unspecified erectile dysfunction type    - REVIEW OF HEALTH MAINTENANCE PROTOCOL ORDERS  - PRIMARY CARE FOLLOW-UP SCHEDULING  - sildenafil (VIAGRA) 50 MG tablet  Dispense: 30 tablet; Refill: 3  - OFFICE/OUTPT VISIT,EST,LEVL IV    Screening for prostate cancer    - Prostate Specific Antigen Screen  - REVIEW OF HEALTH MAINTENANCE PROTOCOL ORDERS  - PRIMARY CARE FOLLOW-UP SCHEDULING  - OFFICE/OUTPT VISIT,EST,LEVL IV    Screen for colon cancer    - Fecal colorectal cancer screen  "FIT  - REVIEW OF HEALTH MAINTENANCE PROTOCOL ORDERS  - PRIMARY CARE FOLLOW-UP SCHEDULING  - OFFICE/OUTPT VISIT,EST,LEVL IV    Medication monitoring encounter    - Comprehensive metabolic panel  - Lipid panel reflex to direct LDL Fasting  - CBC with platelets  - CK total  - UA Macroscopic with reflex to Microscopic and Culture  - Albumin Random Urine Quantitative with Creat Ratio  - TSH with free T4 reflex  - Prostate Specific Antigen Screen  - Fecal colorectal cancer screen FIT  - REVIEW OF HEALTH MAINTENANCE PROTOCOL ORDERS  - PRIMARY CARE FOLLOW-UP SCHEDULING  - OFFICE/OUTPT VISIT,EST,LEVL IV    Need for shingles vaccine    - ZOSTER RECOMBINANT ADJUVANTED (SHINGRIX)  - REVIEW OF HEALTH MAINTENANCE PROTOCOL ORDERS  - PRIMARY CARE FOLLOW-UP SCHEDULING  - OFFICE/OUTPT VISIT,EST,LEVL IV    Need for hepatitis A and B vaccination    - REVIEW OF HEALTH MAINTENANCE PROTOCOL ORDERS  - PRIMARY CARE FOLLOW-UP SCHEDULING  - HEP A & B (TWINRIX)  - OFFICE/OUTPT VISIT,EST,LEVL IV      Patient has been advised of split billing requirements and indicates understanding: Yes    BMI  Estimated body mass index is 28.07 kg/m  as calculated from the following:    Height as of this encounter: 1.791 m (5' 10.5\").    Weight as of this encounter: 90 kg (198 lb 6.4 oz).       Counseling  Appropriate preventive services were addressed with this patient via screening, questionnaire, or discussion as appropriate for fall prevention, nutrition, physical activity, Tobacco-use cessation, social engagement, weight loss and cognition.  Checklist reviewing preventive services available has been given to the patient.  Reviewed patient's diet, addressing concerns and/or questions.   He is at risk for lack of exercise and has been provided with information to increase physical activity for the benefit of his well-being.     Plan:    1) Medications: reviewed, refilled    2) Labs: pending    3) Immunizations: Twinrix #2, Shingrix #2    4) " Imaging/Diagnostics: Fibroscan / abd US 2026    5) Consults: Psychology, ENT, Sleep, Derm, spine    Return in about 1 year (around 5/15/2026) for Complete Physical, Medication Recheck Visit, Follow Up Chronic.    49 minutes spent by myself on the date of the encounter doing chart review, history and exam, documentation and further activities per the note.    The longitudinal plan of care for the diagnosis(es)/condition(s) as documented were addressed during this visit. Due to the added complexity in care, I will continue to support Pat in the subsequent management and with ongoing continuity of care.    Shared Decision making completed.    Beena Stanton is a 65 year old, presenting for the following:  Physical        5/15/2025     6:56 AM   Additional Questions   Roomed by Zaynab LUCIO CMA   Accompanied by spouse      HPI    Patient is fasting.       Hyperlipidemia Follow-Up    Are you regularly taking any medication or supplement to lower your cholesterol?   Yes- rosuvastatin  Are you having muscle aches or other side effects that you think could be caused by your cholesterol lowering medication?  No    Recent Labs   Lab Test 05/15/24  1520 05/15/23  1419   CHOL 144 154   HDL 41 45   LDL 68 77   TRIG 177* 160*     NAFLD - no issues -- due for fibroscan every 2-3 yrs    8/2023    1. Estimated liver fibrosis is Stage 0-1   2. Steatosis Grade S3     FH AAA - negative US in 1/2023    GERD / Hiatal Hernia - using Tums BID with good success    Hx of Skin Cancer - due for follow up    Tinnitus - desires evaluation    MAYITO - using CPAP every night    Depression / Anxiety / Increased irritability - meds vs psychology - work is stressful, etc - family members on prozac    Lumbar Spine - desires follow up with spine clinic    Advance Care Planning    Discussed advance care planning with patient; informed AVS has link to [facility].        5/12/2025   General Health   How would you rate your overall physical health? Good    Feel stress (tense, anxious, or unable to sleep) Only a little   (!) STRESS CONCERN      2025   Nutrition   Three or more servings of calcium each day? Yes   Diet: Low salt    Low fat/cholesterol   How many servings of fruit and vegetables per day? (!) 0-1   How many sweetened beverages each day? 0-1       Multiple values from one day are sorted in reverse-chronological order         2025   Exercise   Days per week of moderate/strenous exercise 3 days   Average minutes spent exercising at this level 30 min         2025   Social Factors   Frequency of gathering with friends or relatives Once a week   Worry food won't last until get money to buy more No   Food not last or not have enough money for food? No   Do you have housing? (Housing is defined as stable permanent housing and does not include staying outside in a car, in a tent, in an abandoned building, in an overnight shelter, or couch-surfing.) Yes   Are you worried about losing your housing? No   Lack of transportation? No   Unable to get utilities (heat,electricity)? No         2025   Fall Risk   Fallen 2 or more times in the past year? No   Trouble with walking or balance? No          2025   Dental   Dentist two times every year? Yes       Today's PHQ-9 Score:       2025    10:23 AM   PHQ-9 SCORE   PHQ-9 Total Score MyChart 3 (Minimal depression)   PHQ-9 Total Score 3        Patient-reported         2025   Substance Use   Alcohol more than 3/day or more than 7/wk No   Do you use any other substances recreationally? No     Social History     Tobacco Use    Smoking status: Former     Current packs/day: 0.00     Average packs/day: 1 pack/day for 2.0 years (2.0 ttl pk-yrs)     Types: Cigarettes     Quit date: 1979     Years since quittin.0    Smokeless tobacco: Former     Types: Chew     Quit date: 2015    Tobacco comments:     Smoked in high schools for a few years - 0.75 ppd x 2 yrs     Quit chew in     Vaping Use    Vaping status: Never Used   Substance Use Topics    Alcohol use: Yes     Alcohol/week: 1.0 - 2.0 standard drink of alcohol     Types: 1 - 2 Standard drinks or equivalent per week     Comment: 1-2 per week    Drug use: Not Currently     Types: Marijuana     Comment: last 2025   STI Screening   New sexual partner(s) since last STI/HIV test? No   Last PSA:   PSA   Date Value Ref Range Status   2021 0.84 0 - 4 ug/L Final     Comment:     Assay Method:  Chemiluminescence using Siemens Vista analyzer     Prostate Specific Antigen Screen   Date Value Ref Range Status   05/15/2024 1.29 0.00 - 4.50 ng/mL Final   10/04/2022 1.12 0.00 - 4.00 ug/L Final     ASCVD Risk   The 10-year ASCVD risk score (Nicky PAGAN, et al., 2019) is: 10.5%    Values used to calculate the score:      Age: 65 years      Sex: Male      Is Non- : No      Diabetic: No      Tobacco smoker: No      Systolic Blood Pressure: 128 mmHg      Is BP treated: No      HDL Cholesterol: 41 mg/dL      Total Cholesterol: 144 mg/dL    Fracture Risk Assessment Tool  Link to Frax Calculator  Use the information below to complete the Frax calculator  : 1960  Sex: male  Weight (kg): 90 kg (actual weight)  Height (cm): 179.1 cm  Previous Fragility Fracture:  No  History of parent with fractured hip:  No  Current Smoking:  No  Patient has been on glucocorticoids for more than 3 months (5mg/day or more): No  Rheumatoid Arthritis on Problem List:  No  Secondary Osteoporosis on Problem List:  No  Consumes 3 or more units of alcohol per day: No  Femoral Neck BMD (g/cm2)           Reviewed and updated as needed this visit by Provider                  Patient Active Problem List   Diagnosis    Hyperlipidemia LDL goal <130    History of skin cancer    Cardiac arrhythmia, unspecified cardiac arrhythmia type    NAFLD (nonalcoholic fatty liver disease)    Hiatal hernia    Gastroesophageal reflux disease  without esophagitis    MAYITO (obstructive sleep apnea)       Past Medical History:   Diagnosis Date    Cardiac arrhythmia, unspecified cardiac arrhythmia type     benign, extensive work up    Gastroesophageal reflux disease without esophagitis     Hiatal hernia     History of skin cancer     SCC, BCC    Hyperlipidemia LDL goal <130     Hypertension     Mild intermittent asthma without complication     resolved    NAFLD (nonalcoholic fatty liver disease) 2023    Metabolic and steatoic liver disease -  Hepatology - Dr Ritter    MAYITO (obstructive sleep apnea)     CPAP - [facility]       Past Surgical History:   Procedure Laterality Date    COLONOSCOPY N/A 10/17/2022    Due 5 yrs -  - Procedure: COLONOSCOPY;  Surgeon: Oneil Cabral MD;  Location:  GI    ESOPHAGOSCOPY, GASTROSCOPY, DUODENOSCOPY (EGD), COMBINED N/A 10/17/2022    Procedure: ESOPHAGOGASTRODUODENOSCOPY (EGD);  Surgeon: Oneil Cabral MD;  Location:  GI    SURGICAL HISTORY OF -  Right     x 2, 2000, 2005, arthroscopy, ACL reconstruction    SURGICAL HISTORY OF -   2017    Deviated septum/soft palpate repair       Current Outpatient Medications   Medication Sig Dispense Refill    aspirin (ASA) 81 MG chewable tablet Take 81 mg by mouth daily      esomeprazole (NEXIUM) 20 MG DR capsule Take 1 capsule (20 mg) by mouth every morning (before breakfast). Take 30-60 minutes before eating. 90 capsule 3    rosuvastatin (CRESTOR) 40 MG tablet Take 1 tablet (40 mg) by mouth daily. 90 tablet 3    sildenafil (VIAGRA) 50 MG tablet Take 1 tablet (50 mg) by mouth daily as needed (30-60 minutes before intercourse). 30 tablet 3       Allergies   Allergen Reactions    Morphine Nausea and Vomiting       Family History   Problem Relation Age of Onset    Colon Cancer Mother 68    Rectal Cancer Mother     Abdominal Aortic Aneurysm Father     Bladder Cancer Father 75    Gallbladder Disease Father     Prostate Cancer Father     Colon Cancer Father      Rectal Cancer Father     Arrhythmia Sister     Heart Failure Maternal Grandmother     Lung Cancer Maternal Grandfather         smoker/ship     Heart Failure Paternal Grandmother     Alcoholism Paternal Grandfather     Obesity Paternal Grandfather     Abdominal Aortic Aneurysm Paternal Uncle        Social History     Socioeconomic History    Marital status:      Spouse name: Rand    Number of children: 3    Years of education: None    Highest education level: None   Tobacco Use    Smoking status: Former     Current packs/day: 0.00     Average packs/day: 1 pack/day for 2.0 years (2.0 ttl pk-yrs)     Types: Cigarettes     Quit date: 1979     Years since quittin.0    Smokeless tobacco: Former     Types: Chew     Quit date: 2015    Tobacco comments:     Smoked in high schools for a few years - 0.75 ppd x 2 yrs     Quit chew in 2014   Vaping Use    Vaping status: Never Used   Substance and Sexual Activity    Alcohol use: Yes     Alcohol/week: 1.0 - 2.0 standard drink of alcohol     Types: 1 - 2 Standard drinks or equivalent per week     Comment: 1-2 per week    Drug use: Not Currently     Types: Marijuana    Sexual activity: Yes     Partners: Female     Birth control/protection: Male Surgical     Social Drivers of Health     Financial Resource Strain: Low Risk  (2025)    Financial Resource Strain     Within the past 12 months, have you or your family members you live with been unable to get utilities (heat, electricity) when it was really needed?: No   Food Insecurity: Low Risk  (2025)    Food Insecurity     Within the past 12 months, did you worry that your food would run out before you got money to buy more?: No     Within the past 12 months, did the food you bought just not last and you didn t have money to get more?: No   Transportation Needs: Low Risk  (2025)    Transportation Needs     Within the past 12 months, has lack of transportation kept you from medical  appointments, getting your medicines, non-medical meetings or appointments, work, or from getting things that you need?: No   Physical Activity: Insufficiently Active (5/12/2025)    Exercise Vital Sign     Days of Exercise per Week: 3 days     Minutes of Exercise per Session: 30 min   Stress: No Stress Concern Present (5/12/2025)    [employer] - Occupational Stress Questionnaire     Feeling of Stress : Only a little   Social Connections: Unknown (5/12/2025)    Social Connection and Isolation Panel [NHANES]     Frequency of Social Gatherings with Friends and Family: Once a week   Interpersonal Safety: Low Risk  (5/15/2024)    Interpersonal Safety     Do you feel physically and emotionally safe where you currently live?: Yes     Within the past 12 months, have you been hit, slapped, kicked or otherwise physically hurt by someone?: No     Within the past 12 months, have you been humiliated or emotionally abused in other ways by your partner or ex-partner?: No   Housing Stability: Low Risk  (5/12/2025)    Housing Stability     Do you have housing? : Yes     Are you worried about losing your housing?: No     Colonoscopy:  due 2027  FIT / Cologuard: ordered  PSA: ordered      Review of Systems  CONSTITUTIONAL: NEGATIVE for fever, chills, change in weight  INTEGUMENTARY/SKIN: NEGATIVE for worrisome rashes, moles or lesions  EYES: NEGATIVE for vision changes or irritation  ENT/MOUTH: NEGATIVE for ear, mouth and throat problems  RESP: NEGATIVE for significant cough or SOB  CV: NEGATIVE for chest pain, palpitations or peripheral edema  GI: NEGATIVE for nausea, abdominal pain, heartburn, or change in bowel habits  : NEGATIVE for frequency, dysuria, or hematuria  MUSCULOSKELETAL: NEGATIVE for significant arthralgias or myalgia  NEURO: NEGATIVE for weakness, dizziness or paresthesias  ENDOCRINE: NEGATIVE for temperature intolerance, skin/hair changes  HEME: NEGATIVE for bleeding  "problems  PSYCHIATRIC: NEGATIVE for changes in mood or affect     Objective    Exam  /86   Pulse 70   Temp 98.1  F (36.7  C) (Tympanic)   Resp 16   Ht 1.791 m (5' 10.5\")   Wt 90 kg (198 lb 6.4 oz)   SpO2 97%   BMI 28.07 kg/m     Estimated body mass index is 28.07 kg/m  as calculated from the following:    Height as of this encounter: 1.791 m (5' 10.5\").    Weight as of this encounter: 90 kg (198 lb 6.4 oz).    Physical Exam  GENERAL: alert and no distress  EYES: Eyes grossly normal to inspection, PERRL and conjunctivae and sclerae normal  HENT: ear canals and TM's normal, nose and mouth without ulcers or lesions  NECK: no adenopathy, no asymmetry, masses, or scars  RESP: lungs clear to auscultation - no rales, rhonchi or wheezes  CV: regular rate and rhythm, normal S1 S2, no S3 or S4, no murmur, click or rub, no peripheral edema  ABDOMEN: soft, nontender, no hepatosplenomegaly, no masses and bowel sounds normal   (male): pt declines  RECTAL: pt declines  MS: no gross musculoskeletal defects noted, no edema  SKIN: no suspicious lesions or rashes  NEURO: Normal strength and tone, mentation intact and speech normal  PSYCH: mentation appears normal, affect normal/bright    Signed Electronically by:           Lucas Capellan MD, FAAFP, [facility]  [facility]  [address] [place] [id number]  [email or web address]   Office: [phone]  Fax: [phone]       Answers submitted by the patient for this visit:  Patient Health Questionnaire (Submitted on 5/14/2025)  If you checked off any problems, how difficult have these problems made it for you to do your work, take care of things at home, or get along with other people?: Not difficult at all  PHQ9 TOTAL SCORE: 3  Patient Health Questionnaire (G7) (Submitted on 5/12/2025)  ZAK 7 TOTAL SCORE: 5    "

## 2025-05-17 ENCOUNTER — APPOINTMENT (OUTPATIENT)
Dept: LAB | Facility: CLINIC | Age: 65
End: 2025-05-17
Payer: COMMERCIAL

## 2025-05-19 ENCOUNTER — PATIENT OUTREACH (OUTPATIENT)
Dept: CARE COORDINATION | Facility: CLINIC | Age: 65
End: 2025-05-19
Payer: COMMERCIAL

## 2025-05-19 LAB — HEMOCCULT STL QL IA: NEGATIVE

## (undated) RX ORDER — FENTANYL CITRATE 50 UG/ML
INJECTION, SOLUTION INTRAMUSCULAR; INTRAVENOUS
Status: DISPENSED
Start: 2022-10-17